# Patient Record
Sex: MALE | Race: WHITE | NOT HISPANIC OR LATINO | URBAN - METROPOLITAN AREA
[De-identification: names, ages, dates, MRNs, and addresses within clinical notes are randomized per-mention and may not be internally consistent; named-entity substitution may affect disease eponyms.]

---

## 2024-12-03 ENCOUNTER — OFFICE VISIT (OUTPATIENT)
Dept: URGENT CARE | Age: 24
End: 2024-12-03
Payer: MEDICARE

## 2024-12-03 VITALS
DIASTOLIC BLOOD PRESSURE: 85 MMHG | HEART RATE: 91 BPM | RESPIRATION RATE: 16 BRPM | OXYGEN SATURATION: 98 % | SYSTOLIC BLOOD PRESSURE: 125 MMHG

## 2024-12-03 DIAGNOSIS — M25.511 ACUTE PAIN OF RIGHT SHOULDER: ICD-10-CM

## 2024-12-03 DIAGNOSIS — M54.50 CHRONIC RIGHT-SIDED LOW BACK PAIN WITHOUT SCIATICA: Primary | ICD-10-CM

## 2024-12-03 DIAGNOSIS — K62.5 RECTAL BLEEDING: ICD-10-CM

## 2024-12-03 DIAGNOSIS — G89.29 CHRONIC RIGHT-SIDED LOW BACK PAIN WITHOUT SCIATICA: Primary | ICD-10-CM

## 2024-12-03 RX ORDER — METHOCARBAMOL 500 MG/1
500 TABLET, FILM COATED ORAL 4 TIMES DAILY
Qty: 30 TABLET | Refills: 0 | Status: SHIPPED | OUTPATIENT
Start: 2024-12-03 | End: 2024-12-13

## 2024-12-03 RX ORDER — ESCITALOPRAM OXALATE 10 MG/1
10 TABLET ORAL DAILY
COMMUNITY

## 2024-12-03 RX ORDER — LIDOCAINE 50 MG/G
1 PATCH TOPICAL DAILY
Qty: 10 PATCH | Refills: 0 | Status: SHIPPED | OUTPATIENT
Start: 2024-12-03

## 2024-12-03 RX ORDER — BUPROPION HYDROCHLORIDE 150 MG/1
TABLET, EXTENDED RELEASE ORAL 2 TIMES DAILY
COMMUNITY

## 2024-12-03 ASSESSMENT — ENCOUNTER SYMPTOMS
FEVER: 0
DIARRHEA: 0
COLOR CHANGE: 0
WOUND: 0
CONSTIPATION: 0
WEAKNESS: 0
BACK PAIN: 1
DIFFICULTY URINATING: 0

## 2024-12-04 NOTE — PROGRESS NOTES
"Subjective   Patient ID: Pillo Guevara is a 24 y.o. male. They present today with a chief complaint of Back Pain.    History of Present Illness  Patient is a 24 year old male presenting with multitude of symptoms. Reports back pain ongoing for years, has had intermittent pain especially over the last 2-3 years. 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 Pain is constant and wakes him up from sleep at night. Primarily located on right side of back and in shoulder. Worse with certain movements. Occasionally taking ibuprofen and using lidocaine patches that helps. Also waking up with intermittent tingling in hands and feet bilaterally at times that lasts for a few minutes.  No personal history of cancer or IV drug use, not immunosuppressed, no weakness or bowel or bladder incontinence but also reports rectal bleeding with bowel movements ongoing for \"awhile.\" No hematuria or urinary issues. Was doing PT previously for body aches that was helping but stopped due to insurance issues. Also reports he is taking estrogen that is not prescribed to him.       History provided by:  Patient   used: No        Past Medical History  Allergies as of 12/03/2024    (No Known Allergies)       (Not in a hospital admission)       No past medical history on file.    No past surgical history on file.         Review of Systems  Review of Systems   Constitutional:  Negative for fever.   Gastrointestinal:  Positive for blood in stool. Negative for constipation and diarrhea.   Genitourinary:  Negative for difficulty urinating.   Musculoskeletal:  Positive for back pain.   Skin:  Negative for color change, pallor, rash and wound.   Neurological:  Positive for numbness. Negative for weakness.                                  Objective    Vitals:    12/03/24 1907   BP: 125/85   Pulse: 91   Resp: 16   SpO2: 98%     No LMP for male patient.    Physical Exam  Constitutional:       General: He is not in " acute distress.     Appearance: Normal appearance. He is normal weight. He is not ill-appearing or toxic-appearing.   HENT:      Head: Normocephalic.   Eyes:      General: No scleral icterus.        Right eye: No discharge.         Left eye: No discharge.      Conjunctiva/sclera: Conjunctivae normal.   Neck:      Trachea: Phonation normal.   Pulmonary:      Effort: Pulmonary effort is normal. No respiratory distress.      Breath sounds: Normal breath sounds. No stridor. No wheezing.   Musculoskeletal:      Comments: Ambulatory with steady gait  Moving all extremities without difficulty   No reproducible tenderness of spine or paraspinal musculature    Neurological:      Mental Status: He is alert.      Gait: Gait normal.      Comments: 5/5 strength of upper and lower extremities    Psychiatric:         Mood and Affect: Mood normal.         Procedures    Point of Care Test & Imaging Results from this visit  No results found for this visit on 12/03/24.   No results found.    Diagnostic study results (if any) were reviewed by Kala Ortiz PA-C.    Assessment/Plan   Allergies, medications, history, and pertinent labs/EKGs/Imaging reviewed by Kala Ortiz PA-C.     Medical Decision Making  Patient presenting with multiple concerns including back pain.  No injury. No red flag symptoms.  He is he hemodynamically stable. No reproducible tenderness to spine or paraspinal musculature. No bony deformities. WOODS with 5/5 strength. Will treat symptomatically but recommended ER if worsening symptoms especially rectal bleeding as may need imaging. He will be referred to physical therapy per his request as this has helped in past as well as primary care to assist with managing some of these ongoing conditions. No injury, red flag symptoms, bony deformities, imaging not indicated at this time.     At time of discharge, patient was clinically well-appearing and appropriate for outpatient management. The  patient/parent/guardian was educated regarding diagnosis, supportive care, OTC and Rx medications. The patient/parent/guardian was given the opportunity to ask questions prior to discharge. They verbalized understanding of discussion of treatment plan, expected course of illness and/or injury, indications on when to return to , when to seek further evaluation in ED/call 911, and the need to follow up with PCP and/or specialist as referred. Patient/parent/guardian was provided with work/school documentation if requested. Patient stable upon discharge.     Orders and Diagnoses  Diagnoses and all orders for this visit:  Chronic right-sided low back pain without sciatica  -     lidocaine (Lidoderm) 5 % patch; Place 1 patch over 12 hours on the skin once daily. Remove & discard patch within 12 hours or as directed by MD.  -     methocarbamol (Robaxin) 500 mg tablet; Take 1 tablet (500 mg) by mouth 4 times a day for 10 days.  -     Referral to Physical Therapy; Future  Rectal bleeding  Acute pain of right shoulder  -     Referral to Physical Therapy; Future      Medical Admin Record      Patient disposition: Home    Electronically signed by Kala Ortiz PA-C  8:02 AM

## 2024-12-04 NOTE — PATIENT INSTRUCTIONS
Please establish care with a primary care doctor. If you have worsening symptoms including rectal bleeding, abdominal pain, numbness, bowel or bladder incontinence, go to the ER, you may need imaging or blood work.     Start using tylenol instead of ibuprofen as this can worsen bleeding.     Robaxin (the muscle relaxer) can make you drowsy. Do not drive or perform tasks that require a lot of concentration while taking.

## 2024-12-05 ASSESSMENT — ENCOUNTER SYMPTOMS
BLOOD IN STOOL: 1
NUMBNESS: 1

## 2025-02-28 ENCOUNTER — APPOINTMENT (OUTPATIENT)
Dept: PHYSICAL THERAPY | Facility: HOSPITAL | Age: 25
End: 2025-02-28
Payer: MEDICARE

## 2025-02-28 ENCOUNTER — EVALUATION (OUTPATIENT)
Dept: PHYSICAL THERAPY | Facility: HOSPITAL | Age: 25
End: 2025-02-28
Payer: MEDICARE

## 2025-02-28 DIAGNOSIS — G89.29 CHRONIC RIGHT-SIDED LOW BACK PAIN WITHOUT SCIATICA: ICD-10-CM

## 2025-02-28 DIAGNOSIS — M54.50 CHRONIC RIGHT-SIDED LOW BACK PAIN WITHOUT SCIATICA: ICD-10-CM

## 2025-02-28 DIAGNOSIS — M25.511 ACUTE PAIN OF RIGHT SHOULDER: ICD-10-CM

## 2025-02-28 DIAGNOSIS — M54.9 UPPER BACK PAIN ON RIGHT SIDE: Primary | ICD-10-CM

## 2025-02-28 PROCEDURE — 97162 PT EVAL MOD COMPLEX 30 MIN: CPT | Mod: GP | Performed by: PHYSICAL THERAPIST

## 2025-02-28 PROCEDURE — 97110 THERAPEUTIC EXERCISES: CPT | Mod: GP | Performed by: PHYSICAL THERAPIST

## 2025-02-28 PROCEDURE — 97535 SELF CARE MNGMENT TRAINING: CPT | Mod: GP | Performed by: PHYSICAL THERAPIST

## 2025-02-28 ASSESSMENT — ENCOUNTER SYMPTOMS
DEPRESSION: 0
OCCASIONAL FEELINGS OF UNSTEADINESS: 0
LOSS OF SENSATION IN FEET: 0

## 2025-02-28 NOTE — PROGRESS NOTES
Physical Therapy Evaluation and Treatment      Patient Name:Pillo Guevara   MRN:[id number]   Today's Date:2/28/2025   Referred by: Kala Ortiz   Time Calculation  Start Time: 0100  Stop Time: 0200  Time Calculation (min): 60 min    PT Evaluation Time Entry  PT Evaluation (Moderate) Time Entry: 35  PT Therapeutic Procedures Time Entry  Therapeutic Exercise Time Entry: 15  Self-Care/Home Mgmt Training: 10       Insurance  Visit number: 1   Approved number of visits: pending  Auth required: Yes  Authorization date range:   Onset Date: 12/3/2024    Payor: [employer] / Plan: [employer] / Product Type: *No Product type* /     Assessment:   23 yo patient presents with main c/o following:  R upper back pain, constant, lasting for a few months, with crunching sensation and noise with shoulder roll, displays rounded shoulder posture, slouched sitting, palpation with trigger point R levator, infraspinatus area, overall good strength but with pain, consistent with posture dysfunction, will engage in PT to work on posture stretching, manual trigger point release and work on scapular strengthening for sx management. Pt is educated in posture stretching, avoid continued habit of cracking joints for stretching, expectations reviewed, sitting posture reviewed, pt verbalized understanding.    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 Pt is in agreement of plan.       Rehab Potential: good  Clinical Presentation: Evolving with changing characteristics   Evaluation Complexity: moderate      Plan:   Visit number: 1   PT Plan: Skilled PT  PT Frequency: Other (Comment) (1-2/week)  Duration: 12  week  Onset Date: 12/03/24  Number of Treatments Authorized: caresource wagner needed  Rehab Potential: Good  Plan of Care Agreement: Patient    Planned interventions include:   R upper  back: posture stretching, STM with massage gun to R upper back, TENS or taping prn, scapular strengthening, can consider dry needling prn.   Lower back: Lower back with posture, body mechanics, core strengthening.  Modalities prn.   Frequency and duration: 1-2x/week 12 weeks   Plan of care was developed with input and agreement by the patient.     Precautions/Fall Risk:  Fall Risk: Low based on professional judgment  Pacemaker: no    Seizures: No    Post Op Movement/Restrictions: No:  Weight Bearing Status: As tolerated  Other Medical precautions:    Therapy Diagnosis:  Problem List Items Addressed This Visit             ICD-10-CM       Musculoskeletal and Injuries    Upper back pain on right side - Primary M54.9    Relevant Orders    Follow Up In Physical Therapy    Chronic right-sided low back pain without sciatica M54.50, G89.29    Relevant Orders    Follow Up In Physical Therapy     Other Visit Diagnoses         Codes    Acute pain of right shoulder     M25.511            Current Problem:   1. Upper back pain on right side  Follow Up In Physical Therapy      2. Chronic right-sided low back pain without sciatica  Referral to Physical Therapy    Follow Up In Physical Therapy      3. Acute pain of right shoulder  Referral to Physical Therapy                Subjective:  General:  General  Reason for Referral: M54.50,G89.29 (ICD-10-CM) - Chronic right-sided low back pain without sciatica  M25.511 (ICD-10-CM) - Acute pain of right shoulder  Referred By: Kala Ortiz  Past Medical History Relevant to Rehab: none significant  Preferred Learning Style: verbal, visual, written  Precautions:  Precautions  DANNY Fall Risk Score (The score of 4 or more indicates an increased risk of falling): 1    HPI: pt referred to PT from urgent care  with c/o R shoulder/upper back pain constant, mid to lower back pain, states LBP chronic but got worse over the last job working at bar with a lot of lifting, 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  Currently still has pain on lower back with lying down and prolonged standing with bending over positions.    R shoulder to upper back pain started a few months ago no DASH, notices increased pain with crunching noise with moving shoulder. Pain steady and affecting sleeping.    Pt is RHD.    Sleeping in regular bed.    Body weight 10-15# over the past year.      IMAGING: none.     PMH: none significant.      MEDICATIONS: ibuprofen prn for pain.  Was taking robaxin that was helpful.      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      BASELINE FUNCTION:  stretching  (seated HS stretching, butterfly stretching, trunk rotation/spinal twist), downward dog, shoulder rotation and cross shoulder stretch), using a backroller. Using theracane.     Pain:  Location of Pain: constant pain R shoulder and upper back, across lower back toward tailbone area.    Current Pain Level (0-10):  2/10  High Pain Level (0-10): 6   Low Pain Level (0-10): 2  Pain Exacerbating Factors: bending and lifting prolonged positioning, reaching with R arm, sleeping. Prolonged sitting, lying down.  Pain Relieving Factors: muscle relaxer.      Patient Awareness: Patient is aware of  his diagnosis and prognosis.       Objective   OBSERVATION: rounded shoulder and scapula protracted posture, slight loss of lumbar lordosis in sitting.    R scapular clock  with muscle crunching at L levator area.     SENSATION: WNL    PALPATION: significant TTP along R levator, rhomboids, infraspinatus area. OK with serratus ant.  OK with L spine paraspinals.      SHOULDER ROM: (R/L) *Pain WNL all directions discomfort with R upper back with overhead reaching.      SHOULDER STRENGTH:  (R/L) *Pain 4+/5 discomfort R upper back with MMT.    Flexion  Extension  Abduction  IR  ER      SPECIAL TEST:  Impingement test:  Painful arc   neg        Scapulohumeral rhythm WNL      FUNCTIONAL OUTCOME MEASURE:--Quick Dash 20%    L spine evaluation:  L spine ROM WNL, pinching sensation with end range ext, R SB and L rot at lower L spine and L.S junction.    Strength WNL B LE  LE bicycle with decreased core control with discomfort on lower back  Upper abdominal able to complete sit up x3    Special test:   Neg SLR  Prone hip ext with pain at L.S junction  L spine joint play decreased with no increased pain  PIST +    Functional strength   SLS WNL  Double leg squat with  no increased pain  Supine bridging single leg with no increased pain  Oswestry 18%    Outcome Measures:  Other Measures  Disability of Arm Shoulder Hand (DASH): 20  Oswestry Disablity Index (DORON): 18     Treatments:     Treatment Performed Today: Treatment provided today:   Educated patient on evaluation findings and POC, expectations and course of PT, questions addressed.     Educated to avoid continued overstretching, educated on proper posture and implication with driving,  HEP instructed    Response to Treatment: improved knowledge and understanding of condition    Education/Resources provided today: Home Program   [employer]:  Access Code: [email or web address]  URL: [email or web address]  Date: 02/28/2025  Prepared by: Sophie Snow    Exercises  - chest stretch - Arms Behind Back  - 3 x daily - 5 x weekly - 1 sets - 3 reps - 5sec hold  - Seated Scapular Retraction  - 3 x daily - 5 x weekly - 1 sets - 3 reps - 5sec hold  - Seated Single Arm Shoulder Row with Anchored Resistance  - 1 x daily - 3 x weekly - 2 sets - 15 reps  - Seated High Shoulder Row with Anchored Resistance  - 1 x daily - 3 x weekly - 2 sets - 15 reps  - Supine Bridge  - 1 x daily - 3 x weekly - 3 sets - 10 reps    OP EDUCATION:  Outpatient Education  Individual(s)  Educated: Patient  Education Provided: Home Exercise Program, POC  Patient/Caregiver Demonstrated Understanding: yes  Plan of Care Discussed and Agreed Upon: yes  Patient Response to Education: Patient/Caregiver Verbalized Understanding of Information    Goals:  Patient's Goal for Treatment: Relieve pain, Reduce symptoms, and Return to prior level of function    To be met at time of discharge:  -- Decrease pain to _<1/10__.  -- Independent with HEP.  -- ROM: no pain with shoulder and L spine ROM with testing.   -- Strength: no pain with MMT B shoulder.   -- Functional outcome: demonstrate awareness of proper sitting posture in clinic.  Able to tolerate scapular strengthening and RTC strengthening with no increased pain x25min in clinic.  Able to tolerate prone hip ext, PIST test with no increased pain or pinching sensation along L spine.  Improve quick dash to <10%, DORON to <8%.   Demonstrate awareness of proper body mechanics and tolerating core strengthening with no increased pain after.  Able to resume a cardio program (rowing at his preference) for fitness program consistently.

## 2025-03-13 ENCOUNTER — APPOINTMENT (OUTPATIENT)
Dept: PHYSICAL THERAPY | Facility: HOSPITAL | Age: 25
End: 2025-03-13
Payer: MEDICARE

## 2025-04-23 ENCOUNTER — TREATMENT (OUTPATIENT)
Dept: PHYSICAL THERAPY | Facility: HOSPITAL | Age: 25
End: 2025-04-23
Payer: MEDICARE

## 2025-04-23 DIAGNOSIS — M54.50 CHRONIC RIGHT-SIDED LOW BACK PAIN WITHOUT SCIATICA: ICD-10-CM

## 2025-04-23 DIAGNOSIS — M54.9 UPPER BACK PAIN ON RIGHT SIDE: ICD-10-CM

## 2025-04-23 DIAGNOSIS — G89.29 CHRONIC RIGHT-SIDED LOW BACK PAIN WITHOUT SCIATICA: ICD-10-CM

## 2025-04-23 PROCEDURE — 97140 MANUAL THERAPY 1/> REGIONS: CPT | Mod: GP,CQ

## 2025-04-23 PROCEDURE — 97110 THERAPEUTIC EXERCISES: CPT | Mod: GP,CQ

## 2025-04-23 NOTE — PROGRESS NOTES
Physical Therapy Treatment    Patient Name:Pillo Guevara  MRN:[id number]  Today's Date:4/23/2025  Referred by: Kala Ortiz  Time Calculation  Start Time: 1520  Stop Time: 1616  Time Calculation (min): 56 min    Therapy Diagnosis  Problem List Items Addressed This Visit           ICD-10-CM    Upper back pain on right side M54.9    Chronic right-sided low back pain without sciatica M54.50, G89.29       Assessment:   Pt tolerated treatment session well this date. Pt with slight crepitus at times intermittently in R shoulder/scapular region during serratus punches and shoulder ROM/weight bearing activities and exercises. Pt with improved ROM in BLE following manual stretching this date, pt also achieving improved ROM as reps/duration elapsed during stretches and exercises. Pt with slight discomfort/soreness in R>L hip region during swiss ball exercises this date.       Plan:  1-2x/week 12 weeks    R upper  back: posture stretching, STM with massage gun to R upper back, TENS or taping prn, scapular strengthening, can consider dry needling prn.   Lower back: Lower back with posture, body mechanics, core strengthening.  Modalities prn.     Insurance  Visit number: 2  Approved number of visits: pending  Auth required: Yes  Authorization date range:   Onset Date: 12/3/2024     Payor: [employer] / Plan: [employer] / Product Type: *No Product type* /     Precautions/Fall Risk:Fall Risk: Low based on professional judgment  Pacemaker: no    Seizures: No    Post Op Movement/Restrictions: No:  Weight Bearing Status: As tolerated  Other Medical precautions:    Subjective/Pain: Pt reports at work as a , or after/during prolonged sitting or laying supine causes pain in mid back, low back and R shoulder region. Pt states it has been difficult to get to sleep and sleep/schedule has been tough. Pt reports various previous issues/injuries related to low back as well.   Current Pain Level (0-10):  2    HEP Compliance: Good    Objective/Outcome Measures:  Fwd head round shoulder posture    Slight dec lumbar lordosis in seated and standing    R scapular crepitus with ROM exercises.    Treatment  Therapeutic Procedure PT Therapeutic Procedures Time Entry  Manual Therapy Time Entry: 12  Therapeutic Exercise Time Entry: 44 minutes      Therapeutic Exercise 44 minutes    6 mins upright bicycle lvl 3  Supine SPC AAROM; B Sh flex and B Serratus Punch x 30 ea  2 mins open/close books on swiss ball   3 mins sciatic glides on swiss ball  2 mins LTR on swiss ball  Quadruped serratus punches x 2 mins  Cipriano pose<>prone on elbows 2 x 30s holds  Wall crawl x 30s  Door way stretch x 30s  Cervical ext self mobe x 30s  Seated retro shoulder rolls 3 x 30  -hep updated and provided-    Manual Therapy 12 minutes    Manual BLE stretches; Hamstring, Piriformis, SKTC 2 x 30s       Neuromuscular Re-ed   minutes      Gait Training   minutes    Modalities   Vasopneumatic Device       minutes  Electrical Stimulation            minutes  Ultrasound                      minutes  Iontophoresis                     minutes  Cold Pack                        minutes  Mechanical Traction           minutes    OP EDUCATION:   Access Code: [email or web address]  URL: [email or web address]  Date: 04/23/2025  Prepared by: Zeeshan Guillen    Exercises  - Seated Levator Scapulae Stretch  - 1 x daily - 7 x weekly - 1 sets - 2 reps - 30s hold  - Doorway Pec Stretch at 90 Degrees Abduction  - 1 x daily - 7 x weekly - 1 sets - 3 reps - 30s hold  - Serratus Activation at Wall  - 1 x daily - 7 x weekly - 1 sets - 10 reps - 10s hold  - Kneeling Plank with Scapular Protraction Retraction AROM  - 1 x daily - 7 x weekly - 3 sets - 10 reps  - Serratus Forearm Push Up Plus at Wall with Elbows  - 1 x daily - 7 x weekly - 3 sets - 10 reps  - Sidelying Open Book Thoracic Lumbar Rotation and Extension  - 1 x daily - 7 x weekly - 2 sets - 10 reps - 2-4s  hold  - Seated Shoulder Rolls  - 1 x daily - 7 x weekly - 3 sets - 10 reps  - Seated Scapular Retraction  - 1 x daily - 7 x weekly - 1 sets - 10 reps - 10s hold  - Child's Pose Stretch  - 1 x daily - 7 x weekly - 1 sets - 2 reps - 30s hold  - Static Prone on Elbows  - 1 x daily - 7 x weekly - 1 sets - 2 reps - 30s hold    Goals:  Patient's Goal for Treatment: Relieve pain, Reduce symptoms, and Return to prior level of function     To be met at time of discharge:  -- Decrease pain to _<1/10__.  -- Independent with HEP.  -- ROM: no pain with shoulder and L spine ROM with testing.   -- Strength: no pain with MMT B shoulder.   -- Functional outcome: demonstrate awareness of proper sitting posture in clinic.  --Able to tolerate scapular strengthening and RTC strengthening with no increased pain x25min in clinic.  --Able to tolerate prone hip ext, PIST test with no increased pain or pinching sensation along L spine.  --Improve quick dash to <10%, DORON to <8%.   --Demonstrate awareness of proper body mechanics and tolerating core strengthening with no increased pain after.  --Able to resume a cardio program (rowing at their preference) for fitness program consistently.

## 2025-05-01 ENCOUNTER — TREATMENT (OUTPATIENT)
Dept: PHYSICAL THERAPY | Facility: HOSPITAL | Age: 25
End: 2025-05-01
Payer: MEDICARE

## 2025-05-01 DIAGNOSIS — M54.9 UPPER BACK PAIN ON RIGHT SIDE: ICD-10-CM

## 2025-05-01 DIAGNOSIS — G89.29 CHRONIC RIGHT-SIDED LOW BACK PAIN WITHOUT SCIATICA: ICD-10-CM

## 2025-05-01 DIAGNOSIS — M54.50 CHRONIC RIGHT-SIDED LOW BACK PAIN WITHOUT SCIATICA: ICD-10-CM

## 2025-05-01 PROCEDURE — 97110 THERAPEUTIC EXERCISES: CPT | Mod: GP,CQ

## 2025-05-01 PROCEDURE — 97140 MANUAL THERAPY 1/> REGIONS: CPT | Mod: GP,CQ

## 2025-05-01 NOTE — PROGRESS NOTES
Physical Therapy Treatment    Patient Name:Pillo Guevara  MRN:[id number]  Today's Date:5/1/2025  Referred by: Kala Ortiz  Time Calculation  Start Time: 1508  Stop Time: 1548  Time Calculation (min): 40 min    Therapy Diagnosis  Problem List Items Addressed This Visit           ICD-10-CM    Upper back pain on right side M54.9    Chronic right-sided low back pain without sciatica M54.50, G89.29       Assessment:   Pt with good overall tolerance with today's treatment. Pt progressing to focusing on improving scap retraction, thoracic extension and back strengthening. Pt receiving manual stretch techniques to improve BLE ROM while also focusing on improve B Shoulder ROM and strength with SPC therex. Pt overall with improved ROM noted this date. Pt then challenged with inc resistance exercises and more difficulty scap stabilization therex. Pt provided with BUE strengthening HEP with resistance bands this date and educated/reviewed      Plan:   1-2x/week 12 weeks    R upper  back: posture stretching, strengthening, TENS or taping prn, scapular strengthening, can consider dry needling prn.   Lower back: Lower back with posture, body mechanics, core strengthening.  Modalities prn.        Insurance  Visit number: 3  Approved number of visits: 20  Auth required: Yes  Authorization date range: 20 VISITS, 2/28/25-5/30/25  Onset Date: 12/3/2024     Payor: [employer] / Plan: [employer] / Product Type: *No Product type* /      Precautions/Fall Risk:Fall Risk: Low based on professional judgment  Pacemaker: no    Seizures: No    Post Op Movement/Restrictions: No:  Weight Bearing Status: As tolerated  Other Medical precautions:    Subjective/Pain: Pt reports pain has diminished since last session. Pt reports feeling a good workout soreness following previous treatment. Pt notes that they went bike riding with a friend and felt pretty good. Pt able to perform all stretches without issues at home. Pt noting  has resistance band and was performing strengthening as well.   Current Pain Level (0-10): 2    HEP Compliance: Good    Objective/Outcome Measures:    Fwd head round shoulder posture     Slight dec lumbar lordosis in seated and standing     R scapular crepitus with ROM exercises.    Treatment  Therapeutic Procedure PT Therapeutic Procedures Time Entry  Manual Therapy Time Entry: 12  Therapeutic Exercise Time Entry: 28 minutes      Therapeutic Exercise 28 minutes  8 mins UBE+BLE with 4/4 lvl 4  Supine 3# SPC; B Sh flex, B Serratus Punch and B Sh ER x 30 ea  3 mins sciatic glides on swiss ball  2 mins LTR on swiss ball  Bridges on swiss ball 2 x 10  Quadruped serratus punches x 2 mins  Cipriano pose<>prone on elbows 2 x 30s holds  Cobra 2 x 30s holds  Cybex rows 35# 3 x 10  Foam roller Serratus activation/thoracic ext at wall x 30  -HEP updated with resistance band strengthening-    Not today  2 mins open/close books on swiss ball   Wall crawl x 30s  Door way stretch x 30s  Cervical ext self mobe x 30s  Seated retro shoulder rolls 3 x 30  -hep updated and provided-      Manual Therapy 12 minutes  Manual BLE stretches; Hamstring, Piriformis, SKTC 2 x 30s    Manual scap retract assessment throughout    Neuromuscular Re-ed   minutes      Gait Training   minutes    Modalities   Vasopneumatic Device       minutes  Electrical Stimulation            minutes  Ultrasound                      minutes  Iontophoresis                     minutes  Cold Pack                        minutes  Mechanical Traction           minutes    OP EDUCATION:   Access Code: [email or web address]  URL: [email or web address]  Date: 05/01/2025  Prepared by: Zeeshan Guillen    Exercises  - Standing Shoulder Horizontal Abduction with Resistance  - 1 x daily - 3-4 x weekly - 3 sets - 10 reps  - Shoulder External Rotation and Scapular Retraction with Resistance  - 1 x daily - 3-4 x weekly - 3 sets - 10 reps  - Shoulder extension with resistance -  Neutral  - 1 x daily - 3-4 x weekly - 3 sets - 10 reps  - Standing Lat Pull Down with Resistance - Elbows Bent  - 1 x daily - 3-4 x weekly - 3 sets - 10 reps  - Standing Tricep Extensions with Resistance  - 1 x daily - 3-4 x weekly - 3 sets - 10 reps  - Standing Shoulder Row with Anchored Resistance  - 1 x daily - 3-4 x weekly - 3 sets - 10 reps  - Standing Bicep Curls with Resistance Band with PLB  - 1 x daily - 3-4 x weekly - 3 sets - 10 reps  - Standing Shoulder Flexion with Resistance  - 1 x daily - 3-4 x weekly - 3 sets - 10 reps  - Shoulder Flexion Serratus Activation with Resistance  - 1 x daily - 3-4 x weekly - 3 sets - 10 reps  - Standing Serratus Punch with Resistance  - 1 x daily - 3-4 x weekly - 3 sets - 10 reps  - Standing Low Trap Setting with Resistance at Wall  - 1 x daily - 3-4 x weekly - 3 sets - 10 reps  - Standing Plank on Wall with Reaches and Resistance  - 1 x daily - 3-4 x weekly - 3 sets - 10 reps    Goals:    Patient's Goal for Treatment: Relieve pain, Reduce symptoms, and Return to prior level of function     To be met at time of discharge:  -- Decrease pain to _<1/10__.  -- Independent with HEP.  -- ROM: no pain with shoulder and L spine ROM with testing.   -- Strength: no pain with MMT B shoulder.   -- Functional outcome: demonstrate awareness of proper sitting posture in clinic.  --Able to tolerate scapular strengthening and RTC strengthening with no increased pain x25min in clinic.  --Able to tolerate prone hip ext, PIST test with no increased pain or pinching sensation along L spine.  --Improve quick dash to <10%, DORON to <8%.   --Demonstrate awareness of proper body mechanics and tolerating core strengthening with no increased pain after.  --Able to resume a cardio program (rowing at their preference) for fitness program consistently.

## 2025-05-06 ENCOUNTER — TREATMENT (OUTPATIENT)
Dept: PHYSICAL THERAPY | Facility: HOSPITAL | Age: 25
End: 2025-05-06
Payer: MEDICARE

## 2025-05-06 DIAGNOSIS — M54.50 CHRONIC RIGHT-SIDED LOW BACK PAIN WITHOUT SCIATICA: ICD-10-CM

## 2025-05-06 DIAGNOSIS — G89.29 CHRONIC RIGHT-SIDED LOW BACK PAIN WITHOUT SCIATICA: ICD-10-CM

## 2025-05-06 DIAGNOSIS — M54.9 UPPER BACK PAIN ON RIGHT SIDE: ICD-10-CM

## 2025-05-06 PROCEDURE — 97110 THERAPEUTIC EXERCISES: CPT | Mod: GP,CQ

## 2025-05-06 PROCEDURE — 97140 MANUAL THERAPY 1/> REGIONS: CPT | Mod: GP,CQ

## 2025-05-06 NOTE — PROGRESS NOTES
Physical Therapy Treatment    Patient Name:Pillo Guevara  MRN:[id number]  Today's Date:5/6/2025  Referred by: Kala Ortiz  Time Calculation  Start Time: 1635  Stop Time: 1723  Time Calculation (min): 48 min    Therapy Diagnosis  Problem List Items Addressed This Visit           ICD-10-CM    Upper back pain on right side M54.9    Chronic right-sided low back pain without sciatica M54.50, G89.29       Assessment:   Pt tolerated treatment session well this date. Pt absent of c/o R scapular shoulder pain, discomfort or clicking/popping this date. Pt challenged with weight bearing/shifting with core/abdominal therex this date along with continued resistance scapular mobility and glenohumeral joint therex in tandem with BLE stretches and manual techniques. Pt continues to perform HEP when pt can. Pt progressing well thus far.       Plan:    1-2x/week 12 weeks    R upper  back: posture stretching, strengthening, TENS or taping prn, scapular strengthening, can consider dry needling prn.   Lower back: Lower back with posture, body mechanics, core strengthening.  Modalities prn.     Insurance  Visit number: 4  Approved number of visits: 20  Auth required: Yes  Authorization date range: 20 VISITS, 2/28/25-5/30/25  Onset Date: 12/3/2024     Payor: [employer] / Plan: [employer] / Product Type: *No Product type* /      Precautions/Fall Risk:Fall Risk: Low based on professional judgment  Pacemaker: no    Seizures: No    Post Op Movement/Restrictions: No:  Weight Bearing Status: As tolerated  Other Medical precautions:      Subjective/Pain: Pt reports slight pain/exacerbation in R sided lower lumbar region. Pt states they were able to stretch this AM following waking up with pain/stiffness and reducing the pain experienced.   Current Pain Level (0-10): 2    HEP Compliance: Good    Objective/Outcome Measures:     Fwd head round shoulder posture     Slight dec lumbar lordosis in seated and standing     R  scapular crepitus with ROM exercises.    Treatment  Therapeutic Procedure PT Therapeutic Procedures Time Entry  Manual Therapy Time Entry: 12  Therapeutic Exercise Time Entry: 36 minutes      Therapeutic Exercise 36 minutes  8 mins upright bicycle lvl 2  Supine 4# SPC; B Sh flex, B Serratus Punch, kayak rows, and B Sh ER x 30 ea  Cipriano pose<>prone on elbows 2 x 30s holds  Quadruped alt LE kicks x 20 ea LE  Quadruped alt UE reach x 20 ea UE  Bird Dogs x 20 ea  3 mins sciatic glides on swiss ball  2 mins LTR on swiss ball  Bridges on swiss ball 2 x 10      Not today  Quadruped serratus punches x 2 mins  Cobra 2 x 30s holds  Cybex rows 35# 3 x 10  Foam roller Serratus activation/thoracic ext at wall x 30  -HEP updated with resistance band strengthening-  2 mins open/close books on swiss ball   Wall crawl x 30s  Door way stretch x 30s  Cervical ext self mobe x 30s  Seated retro shoulder rolls 3 x 30  -hep updated and provided-      Manual Therapy 12 minutes    Manual BLE stretches; Hamstring, Piriformis, SKTC 2 x 30s    Manual scap retract assessment throughout       Neuromuscular Re-ed   minutes      Gait Training   minutes    Modalities   Vasopneumatic Device       minutes  Electrical Stimulation            minutes  Ultrasound                      minutes  Iontophoresis                     minutes  Cold Pack                        minutes  Mechanical Traction           minutes    OP EDUCATION:       Goals:     Patient's Goal for Treatment: Relieve pain, Reduce symptoms, and Return to prior level of function     To be met at time of discharge:  -- Decrease pain to _<1/10__.  -- Independent with HEP.  -- ROM: no pain with shoulder and L spine ROM with testing.   -- Strength: no pain with MMT B shoulder.   -- Functional outcome: demonstrate awareness of proper sitting posture in clinic.  --Able to tolerate scapular strengthening and RTC strengthening with no increased pain x25min in clinic.  --Able to tolerate prone hip  ext, PIST test with no increased pain or pinching sensation along L spine.  --Improve quick dash to <10%, DORON to <8%.   --Demonstrate awareness of proper body mechanics and tolerating core strengthening with no increased pain after.  --Able to resume a cardio program (rowing at their preference) for fitness program consistently.

## 2025-05-12 ENCOUNTER — APPOINTMENT (OUTPATIENT)
Dept: PRIMARY CARE | Facility: CLINIC | Age: 25
End: 2025-05-12
Payer: MEDICARE

## 2025-05-14 ENCOUNTER — OFFICE VISIT (OUTPATIENT)
Dept: PRIMARY CARE | Facility: CLINIC | Age: 25
End: 2025-05-14
Payer: MEDICARE

## 2025-05-14 VITALS
DIASTOLIC BLOOD PRESSURE: 68 MMHG | BODY MASS INDEX: 34.95 KG/M2 | SYSTOLIC BLOOD PRESSURE: 120 MMHG | HEART RATE: 94 BPM | HEIGHT: 72 IN | OXYGEN SATURATION: 97 % | WEIGHT: 258 LBS

## 2025-05-14 DIAGNOSIS — F33.2 SEVERE EPISODE OF RECURRENT MAJOR DEPRESSIVE DISORDER, WITHOUT PSYCHOTIC FEATURES (MULTI): ICD-10-CM

## 2025-05-14 DIAGNOSIS — F17.200 NICOTINE USE DISORDER: ICD-10-CM

## 2025-05-14 DIAGNOSIS — M54.9 UPPER BACK PAIN ON RIGHT SIDE: ICD-10-CM

## 2025-05-14 DIAGNOSIS — M54.50 CHRONIC RIGHT-SIDED LOW BACK PAIN WITHOUT SCIATICA: ICD-10-CM

## 2025-05-14 DIAGNOSIS — Z78.9: ICD-10-CM

## 2025-05-14 DIAGNOSIS — G89.29 CHRONIC RIGHT-SIDED LOW BACK PAIN WITHOUT SCIATICA: ICD-10-CM

## 2025-05-14 DIAGNOSIS — F41.1 GAD (GENERALIZED ANXIETY DISORDER): ICD-10-CM

## 2025-05-14 DIAGNOSIS — F33.41 MAJOR DEPRESSIVE DISORDER, RECURRENT EPISODE, IN PARTIAL REMISSION: ICD-10-CM

## 2025-05-14 DIAGNOSIS — F12.20 CANNABIS DEPENDENCE: ICD-10-CM

## 2025-05-14 DIAGNOSIS — Z00.00 WELL ADULT EXAM: Primary | ICD-10-CM

## 2025-05-14 PROCEDURE — 99385 PREV VISIT NEW AGE 18-39: CPT | Performed by: STUDENT IN AN ORGANIZED HEALTH CARE EDUCATION/TRAINING PROGRAM

## 2025-05-14 PROCEDURE — 3008F BODY MASS INDEX DOCD: CPT | Performed by: STUDENT IN AN ORGANIZED HEALTH CARE EDUCATION/TRAINING PROGRAM

## 2025-05-14 PROCEDURE — 99214 OFFICE O/P EST MOD 30 MIN: CPT | Performed by: STUDENT IN AN ORGANIZED HEALTH CARE EDUCATION/TRAINING PROGRAM

## 2025-05-14 RX ORDER — ESCITALOPRAM OXALATE 10 MG/1
10 TABLET ORAL DAILY
Qty: 90 TABLET | Refills: 0 | Status: SHIPPED | OUTPATIENT
Start: 2025-05-14

## 2025-05-14 RX ORDER — CYCLOBENZAPRINE HCL 5 MG
5 TABLET ORAL NIGHTLY PRN
Qty: 21 TABLET | Refills: 0 | Status: SHIPPED | OUTPATIENT
Start: 2025-05-14 | End: 2025-06-04

## 2025-05-14 RX ORDER — BUPROPION HYDROCHLORIDE 150 MG/1
150 TABLET, EXTENDED RELEASE ORAL 2 TIMES DAILY
Qty: 180 TABLET | Refills: 0 | Status: SHIPPED | OUTPATIENT
Start: 2025-05-14

## 2025-05-14 RX ORDER — NAPROXEN 500 MG/1
500 TABLET ORAL 2 TIMES DAILY PRN
Qty: 28 TABLET | Refills: 0 | Status: SHIPPED | OUTPATIENT
Start: 2025-05-14 | End: 2025-05-28

## 2025-05-14 ASSESSMENT — COLUMBIA-SUICIDE SEVERITY RATING SCALE - C-SSRS
1. IN THE PAST MONTH, HAVE YOU WISHED YOU WERE DEAD OR WISHED YOU COULD GO TO SLEEP AND NOT WAKE UP?: NO
6. HAVE YOU EVER DONE ANYTHING, STARTED TO DO ANYTHING, OR PREPARED TO DO ANYTHING TO END YOUR LIFE?: NO
2. HAVE YOU ACTUALLY HAD ANY THOUGHTS OF KILLING YOURSELF?: NO

## 2025-05-14 ASSESSMENT — PATIENT HEALTH QUESTIONNAIRE - PHQ9
2. FEELING DOWN, DEPRESSED OR HOPELESS: SEVERAL DAYS
1. LITTLE INTEREST OR PLEASURE IN DOING THINGS: SEVERAL DAYS
SUM OF ALL RESPONSES TO PHQ9 QUESTIONS 1 AND 2: 2
2. FEELING DOWN, DEPRESSED OR HOPELESS: SEVERAL DAYS
10. IF YOU CHECKED OFF ANY PROBLEMS, HOW DIFFICULT HAVE THESE PROBLEMS MADE IT FOR YOU TO DO YOUR WORK, TAKE CARE OF THINGS AT HOME, OR GET ALONG WITH OTHER PEOPLE: SOMEWHAT DIFFICULT
1. LITTLE INTEREST OR PLEASURE IN DOING THINGS: SEVERAL DAYS
SUM OF ALL RESPONSES TO PHQ9 QUESTIONS 1 AND 2: 2

## 2025-05-14 ASSESSMENT — ENCOUNTER SYMPTOMS
EYE REDNESS: 0
ARTHRALGIAS: 1
SINUS PRESSURE: 0
ABDOMINAL PAIN: 0
SHORTNESS OF BREATH: 0
CHEST TIGHTNESS: 0
MYALGIAS: 1
AGITATION: 0
FEVER: 0
SORE THROAT: 0
BACK PAIN: 1
WHEEZING: 0
DIZZINESS: 0
PHOTOPHOBIA: 0
COUGH: 0
CHILLS: 0
SINUS PAIN: 0

## 2025-05-14 ASSESSMENT — ANXIETY QUESTIONNAIRES
7. FEELING AFRAID AS IF SOMETHING AWFUL MIGHT HAPPEN: SEVERAL DAYS
4. TROUBLE RELAXING: NOT AT ALL
IF YOU CHECKED OFF ANY PROBLEMS ON THIS QUESTIONNAIRE, HOW DIFFICULT HAVE THESE PROBLEMS MADE IT FOR YOU TO DO YOUR WORK, TAKE CARE OF THINGS AT HOME, OR GET ALONG WITH OTHER PEOPLE: SOMEWHAT DIFFICULT
3. WORRYING TOO MUCH ABOUT DIFFERENT THINGS: SEVERAL DAYS
6. BECOMING EASILY ANNOYED OR IRRITABLE: SEVERAL DAYS
1. FEELING NERVOUS, ANXIOUS, OR ON EDGE: SEVERAL DAYS
2. NOT BEING ABLE TO STOP OR CONTROL WORRYING: NOT AT ALL
GAD7 TOTAL SCORE: 5
5. BEING SO RESTLESS THAT IT IS HARD TO SIT STILL: SEVERAL DAYS

## 2025-05-14 NOTE — ASSESSMENT & PLAN NOTE
DIY Estrogen  Estradiol enanthate  20mg/ml solution    0.3 ml/weekly  Getting from a friend who get the medication online

## 2025-05-14 NOTE — PROGRESS NOTES
Subjective   Patient ID: Margaret Guevara is a 24 y.o. adult who presents for New Patient Visit (Establish care, back pain that is chronic, No specific injury that they can recall. ).    SUBJECTIVE:   Margaret Guevara is a 24 y.o. male presenting for his annual checkup/establish care. Hx of MDD/Anxiety, acute on chronic upper and lower back pain, non radiating, no numbness, tingling, paresthesia, no saddle anesthesia, no urinary or bowel changes, no trauma. Aggravated from strenuous activity, gym, rowing crew, improves and alleviates with rest. Currently unemployed has potential employment doing interviews, lives alone, sexually active with both female and male, hx of hemorrhoids no brbpr/melena. Denies SI/HI/hallucinations. Vapes and marijuana user cutting back on his own, social drinker. Denies fever, chills, sob,  Current Outpatient Medications:  buPROPion SR (Wellbutrin SR) 150 mg 12 hr tablet, Take by mouth 2 times a day. Do not crush, chew, or split., Disp: , Rfl:   escitalopram (Lexapro) 10 mg tablet, Take 1 tablet (10 mg) by mouth once daily., Disp: , Rfl:   lidocaine (Lidoderm) 5 % patch, Place 1 patch over 12 hours on the skin once daily. Remove & discard patch within 12 hours or as directed by MD., Disp: 10 patch, Rfl: 0  methocarbamol (Robaxin) 500 mg tablet, Take 1 tablet (500 mg) by mouth 4 times a day for 10 days., Disp: 30 tablet, Rfl: 0    No current facility-administered medications for this visit.    Allergies: Patient has no known allergies.     ROS:  Feeling well. No dyspnea or chest pain on exertion. No abdominal pain, change in bowel habits, black or bloody stools. No urinary tract symptoms. No neurological complaints.    {lifestyle advice:174414}           Review of Systems   Constitutional:  Negative for chills and fever.   HENT:  Negative for congestion, postnasal drip, sinus pressure, sinus pain, sore throat and tinnitus.    Eyes:  Negative for photophobia and redness.   Respiratory:   Negative for cough, chest tightness, shortness of breath and wheezing.    Cardiovascular:  Negative for chest pain.   Gastrointestinal:  Negative for abdominal pain.   Musculoskeletal:  Positive for arthralgias, back pain and myalgias.   Skin:  Negative for rash.   Neurological:  Negative for dizziness.   Psychiatric/Behavioral:  Negative for agitation.    All other systems reviewed and are negative.      Objective   /68   Pulse 94   Wt 117 kg (258 lb)   SpO2 97%     Physical Exam  Vitals and nursing note reviewed.   Constitutional:       Appearance: Normal appearance.   HENT:      Head: Normocephalic and atraumatic.      Nose: Nose normal.   Eyes:      Extraocular Movements: Extraocular movements intact.      Conjunctiva/sclera: Conjunctivae normal.      Pupils: Pupils are equal, round, and reactive to light.      Comments: Conjunctiva normal   No discharge, drainage  No chemosis  No periorbital or orbital cellulitis    Cardiovascular:      Rate and Rhythm: Normal rate and regular rhythm.   Pulmonary:      Effort: Pulmonary effort is normal.      Breath sounds: Normal breath sounds.      Comments: Speaking in full sentences  No labored breathing  No use of accessory muscles  LUNGS CTAB   Abdominal:      Palpations: Abdomen is soft.   Musculoskeletal:         General: Tenderness present. Normal range of motion.      Cervical back: Normal range of motion.      Comments: Spine midline  No bony ttp  + paraspinal lumbar and thoracic   +hypertonic ttp /spasm  Gait stable   Skin:     General: Skin is warm and dry.      Capillary Refill: Capillary refill takes less than 2 seconds.   Neurological:      General: No focal deficit present.      Mental Status: She is alert and oriented to person, place, and time.   Psychiatric:         Mood and Affect: Mood normal.         Behavior: Behavior normal.         Thought Content: Thought content normal.         Judgment: Judgment normal.         Assessment/Plan    {Assess/[facility]:25368}        exercise  Alternate between cool and warm compresses  Flexeril precaution discussed  Orders:    cyclobenzaprine (Flexeril) 5 mg tablet; Take 1 tablet (5 mg) by mouth as needed at bedtime for muscle spasms for up to 21 days.    naproxen (Naprosyn) 500 mg tablet; Take 1 tablet (500 mg) by mouth 2 times a day as needed for mild pain (1 - 3) (pain) for up to 14 days.    Chronic right-sided low back pain without sciatica  Stable  No red flags or alarming signs  No focal neurological deficit on exam  Rx naproxen, flexeril  As above  Orders:    cyclobenzaprine (Flexeril) 5 mg tablet; Take 1 tablet (5 mg) by mouth as needed at bedtime for muscle spasms for up to 21 days.    naproxen (Naprosyn) 500 mg tablet; Take 1 tablet (500 mg) by mouth 2 times a day as needed for mild pain (1 - 3) (pain) for up to 14 days.    Severe episode of recurrent major depressive disorder, without psychotic features (Multi)  Stable  Denies SI/HI/Hallucinations  Continue wellbutrin, lexapro  Encouraged compliancy  Referral to Behavior Health for CBT         Cannabis dependence  Counseled on health risk with cannabis use       Nicotine use disorder  Counseled on smoking cessation  Declined nicotine replacement at this time       BMI 34.0-34.9,adult  Lifestyle changes with heart healthy diet, exercise, and weight loss

## 2025-05-15 ENCOUNTER — TREATMENT (OUTPATIENT)
Dept: PHYSICAL THERAPY | Facility: HOSPITAL | Age: 25
End: 2025-05-15
Payer: MEDICARE

## 2025-05-15 DIAGNOSIS — M54.9 UPPER BACK PAIN ON RIGHT SIDE: ICD-10-CM

## 2025-05-15 DIAGNOSIS — M54.50 CHRONIC RIGHT-SIDED LOW BACK PAIN WITHOUT SCIATICA: ICD-10-CM

## 2025-05-15 DIAGNOSIS — G89.29 CHRONIC RIGHT-SIDED LOW BACK PAIN WITHOUT SCIATICA: ICD-10-CM

## 2025-05-15 PROCEDURE — 97110 THERAPEUTIC EXERCISES: CPT | Mod: GP,CQ

## 2025-05-15 PROCEDURE — 97140 MANUAL THERAPY 1/> REGIONS: CPT | Mod: GP,CQ

## 2025-05-15 NOTE — PROGRESS NOTES
Physical Therapy Treatment    Patient Name:Pillo Guevara  MRN:[id number]  Today's Date:5/15/2025  Referred by: Kala Ortiz  Time Calculation  Start Time: 1638  Stop Time: 1734  Time Calculation (min): 56 min    Therapy Diagnosis  Problem List Items Addressed This Visit           ICD-10-CM    Upper back pain on right side M54.9    Chronic right-sided low back pain without sciatica M54.50, G89.29       Assessment:   Pt with good tolerance of today's treatment. Pt performing thoracic extension stretch and decompression stretches this date to alleviate current symptoms of L>R sided thoracic sharp/ache and lumbar region tightness/strain. Pt tolerating all therex without inc in symptoms.       Plan:     1-2x/week 12 weeks    R upper  back: posture stretching, strengthening, TENS or taping prn, scapular strengthening, can consider dry needling prn.   Lower back: Lower back with posture, body mechanics, core strengthening.  Modalities prn.     Insurance  Visit number: 5  Approved number of visits: 20  Auth required: Yes  Authorization date range: 20 VISITS, 2/28/25-5/30/25  Onset Date: 12/3/2024     Payor: [employer] / Plan: [employer] / Product Type: *No Product type* /     Precautions/Fall Risk:Fall Risk: Low based on professional judgment  Pacemaker: no    Seizures: No    Post Op Movement/Restrictions: No:  Weight Bearing Status: As tolerated  Other Medical precautions:    Subjective/Pain: Pt reports going to the gym the other day and using rowing machine and other equipment which increased L>R sided thoracic soreness initially, but decreasing following rest and stretches.  Current Pain Level (0-10): 2    HEP Compliance: Good    Objective/Outcome Measures:     Fwd head round shoulder posture     Slight dec lumbar lordosis in seated and standing     R scapular crepitus with ROM exercises.    Treatment  Therapeutic Procedure PT Therapeutic Procedures Time Entry  Manual Therapy Time Entry:  12  Therapeutic Exercise Time Entry: 44 minutes      Therapeutic Exercise 44 minutes    4/4 UBE + BLE lvl 5  Supine thoracic extension over foam roller x 6 mins  Supine Green theraband; B horizontal abduction, B shoulder external rotation   Standing cable column, 10#- D2 ext 3 x 10 ea UE  Standing cable column, 20#- Australian rows/press 2 x 10  Thoracic extension over swiss ball 4 x 1 min  Serratus punches into // x 50  Standing pull-up bar decompression 3 x 1 min      Not today  Supine 4# SPC; B Sh flex, B Serratus Punch, kayak rows, and B Sh ER x 30 ea  Cipriano pose<>prone on elbows 2 x 30s holds  Quadruped alt LE kicks x 20 ea LE  Quadruped alt UE reach x 20 ea UE  Bird Dogs x 20 ea  3 mins sciatic glides on swiss ball  2 mins LTR on swiss ball  Bridges on swiss ball 2 x 10  Quadruped serratus punches x 2 mins  Cobra 2 x 30s holds  Cybex rows 35# 3 x 10  Foam roller Serratus activation/thoracic ext at wall x 30  -HEP updated with resistance band strengthening-  2 mins open/close books on swiss ball   Wall crawl x 30s  Door way stretch x 30s  Cervical ext self mobe x 30s  Seated retro shoulder rolls 3 x 30  -hep updated and provided-       Manual Therapy 12 minutes     Manual BLE stretches; Hamstring, Piriformis, SKTC 2 x 30s    Manual scap retract assessment throughout       Neuromuscular Re-ed   minutes      Gait Training   minutes    Modalities   Vasopneumatic Device       minutes  Electrical Stimulation            minutes  Ultrasound                      minutes  Iontophoresis                     minutes  Cold Pack                        minutes  Mechanical Traction           minutes    OP EDUCATION:       Goals:     Patient's Goal for Treatment: Relieve pain, Reduce symptoms, and Return to prior level of function     To be met at time of discharge:  -- Decrease pain to _<1/10__.  -- Independent with HEP.  -- ROM: no pain with shoulder and L spine ROM with testing.   -- Strength: no pain with MMT B shoulder.   --  Functional outcome: demonstrate awareness of proper sitting posture in clinic.  --Able to tolerate scapular strengthening and RTC strengthening with no increased pain x25min in clinic.  --Able to tolerate prone hip ext, PIST test with no increased pain or pinching sensation along L spine.  --Improve quick dash to <10%, DORON to <8%.   --Demonstrate awareness of proper body mechanics and tolerating core strengthening with no increased pain after.  --Able to resume a cardio program (rowing at their preference) for fitness program consistently.

## 2025-05-22 ENCOUNTER — TREATMENT (OUTPATIENT)
Dept: PHYSICAL THERAPY | Facility: HOSPITAL | Age: 25
End: 2025-05-22
Payer: MEDICARE

## 2025-05-22 DIAGNOSIS — M54.9 UPPER BACK PAIN ON RIGHT SIDE: ICD-10-CM

## 2025-05-22 DIAGNOSIS — G89.29 CHRONIC RIGHT-SIDED LOW BACK PAIN WITHOUT SCIATICA: ICD-10-CM

## 2025-05-22 DIAGNOSIS — M54.50 CHRONIC RIGHT-SIDED LOW BACK PAIN WITHOUT SCIATICA: ICD-10-CM

## 2025-05-22 PROCEDURE — 97140 MANUAL THERAPY 1/> REGIONS: CPT | Mod: GP,CQ

## 2025-05-22 PROCEDURE — 97110 THERAPEUTIC EXERCISES: CPT | Mod: GP,CQ

## 2025-05-22 NOTE — PROGRESS NOTES
Physical Therapy Treatment    Patient Name:Pillo Guevara  MRN:[id number]  Today's Date:5/22/2025  Referred by: Kala Ortiz  Time Calculation  Start Time: 1633  Stop Time: 1731  Time Calculation (min): 58 min    Therapy Diagnosis  Problem List Items Addressed This Visit           ICD-10-CM    Upper back pain on right side M54.9    Chronic right-sided low back pain without sciatica M54.50, G89.29       Assessment:   Pt with good tolerance of today's treatment. Pt challenged with increased resistance, higher level core/abdominal stability exercises and RTC therex. Pt absent of c/o pain or discomfort this date throughout treatment. Pt with improved overall endurance during exercises. Pt requiring min/mod cues for proper technique and posture at times.     Plan:      1-2x/week 12 weeks    R upper  back: posture stretching, strengthening, TENS or taping prn, scapular strengthening, can consider dry needling prn.   Lower back: Lower back with posture, body mechanics, core strengthening.  Modalities prn.     Insurance  Visit number: 6  Approved number of visits: 20  Auth required: Yes  Authorization date range: 20 VISITS, 2/28/25-5/30/25  Onset Date: 12/3/2024     Payor: [employer] / Plan: [employer] / Product Type: *No Product type* /      Precautions/Fall Risk:Fall Risk: Low based on professional judgment  Pacemaker: no    Seizures: No    Post Op Movement/Restrictions: No:  Weight Bearing Status: As tolerated    Subjective/Pain: Pt reports after starting new job has had a slight increase in back pain. Pt reports only going to gym 1-2 times since last session, focusing on strengthening core/abdominals.   Current Pain Level (0-10): 2    HEP Compliance: Good    Objective/Outcome Measures:     Fwd head round shoulder posture     Slight dec lumbar lordosis in seated and standing     R scapular crepitus with ROM exercises.    Treatment  Therapeutic Procedure PT Therapeutic Procedures Time  Entry  Manual Therapy Time Entry: 13  Therapeutic Exercise Time Entry: 45 minutes      Therapeutic Exercise 45 minutes    8 mins; UBE + BLE lvl 8  Supine 10# SPC; Serratus Punches, B Sh flexion and B kayak style rows x 30 ea  Supine Blue theraband; B horizontal abduction, B shoulder external rotation  x 30 ea  Standing B Sh ER red band isometric + B Sh flex on foam roller x 30  Praying mantis on swiss ball 2 x 15  Standing cable column, 25#- caryl rows/press 2 x 10   Serratus punches into // x 50   Quadruped; alt UE weight shifts on bosu, dome down- 3 x 10    Not today  Supine 4# SPC; B Sh flex, B Serratus Punch, kayak rows, and B Sh ER x 30 ea  Cipriano pose<>prone on elbows 2 x 30s holds  Quadruped alt LE kicks x 20 ea LE  Quadruped alt UE reach x 20 ea UE  Bird Dogs x 20 ea  3 mins sciatic glides on swiss ball  2 mins LTR on swiss ball  Bridges on swiss ball 2 x 10  Quadruped serratus punches x 2 mins  Cobra 2 x 30s holds  Cybex rows 35# 3 x 10  Foam roller Serratus activation/thoracic ext at wall x 30  -HEP updated with resistance band strengthening-  2 mins open/close books on swiss ball   Wall crawl x 30s  Door way stretch x 30s  Cervical ext self mobe x 30s  Seated retro shoulder rolls 3 x 30  -hep updated and provided-      Manual Therapy 13 minutes    Manual BLE stretches; Hamstring, Piriformis, SKTC 2 x 30s    Manual scap retract assessment throughout    Neuromuscular Re-ed   minutes      Gait Training   minutes    Modalities   Vasopneumatic Device       minutes  Electrical Stimulation            minutes  Ultrasound                      minutes  Iontophoresis                     minutes  Cold Pack                        minutes  Mechanical Traction           minutes    OP EDUCATION:       Goals:    Patient's Goal for Treatment: Relieve pain, Reduce symptoms, and Return to prior level of function     To be met at time of discharge:  -- Decrease pain to _<1/10__.  -- Independent with HEP.  -- ROM: no pain with  shoulder and L spine ROM with testing.   -- Strength: no pain with MMT B shoulder.   -- Functional outcome: demonstrate awareness of proper sitting posture in clinic.  --Able to tolerate scapular strengthening and RTC strengthening with no increased pain x25min in clinic.  --Able to tolerate prone hip ext, PIST test with no increased pain or pinching sensation along L spine.  --Improve quick dash to <10%, DORON to <8%.   --Demonstrate awareness of proper body mechanics and tolerating core strengthening with no increased pain after.  --Able to resume a cardio program (rowing at their preference) for fitness program consistently.

## 2025-05-27 ENCOUNTER — TREATMENT (OUTPATIENT)
Dept: PHYSICAL THERAPY | Facility: HOSPITAL | Age: 25
End: 2025-05-27
Payer: MEDICARE

## 2025-05-27 DIAGNOSIS — G89.29 CHRONIC RIGHT-SIDED LOW BACK PAIN WITHOUT SCIATICA: ICD-10-CM

## 2025-05-27 DIAGNOSIS — M54.9 UPPER BACK PAIN ON RIGHT SIDE: ICD-10-CM

## 2025-05-27 DIAGNOSIS — M54.50 CHRONIC RIGHT-SIDED LOW BACK PAIN WITHOUT SCIATICA: ICD-10-CM

## 2025-05-27 PROCEDURE — 97110 THERAPEUTIC EXERCISES: CPT | Mod: GP,CQ

## 2025-05-27 NOTE — PROGRESS NOTES
Physical Therapy Treatment    Patient Name:Pillo Guevara  MRN:[id number]  Today's Date:5/27/2025  Referred by: Kala Ortiz  Time Calculation  Start Time: 1630  Stop Time: 1715  Time Calculation (min): 45 min    Therapy Diagnosis  Problem List Items Addressed This Visit           ICD-10-CM    Upper back pain on right side M54.9    Chronic right-sided low back pain without sciatica M54.50, G89.29       Assessment:   Pt with good tolerance of today's treatment. Pt initially with c/o inc in lower back discomfort/spasm. Pt performing lower back stretches/therex to reduce symptoms to improve tolerance of treatment.       Plan:    1-2x/week 12 weeks    R upper  back: posture stretching, strengthening, TENS or taping prn, scapular strengthening, can consider dry needling prn.   Lower back: Lower back with posture, body mechanics, core strengthening.  Modalities prn.     Insurance  Visit number: 7  Approved number of visits: 20  Auth required: Yes  Authorization date range: 20 VISITS, 2/28/25-5/30/25  Onset Date: 12/3/2024     Payor: [employer] / Plan: [employer] / Product Type: *No Product type* /      Precautions/Fall Risk:Fall Risk: Low based on professional judgment  Pacemaker: no    Seizures: No    Post Op Movement/Restrictions: No:  Weight Bearing Status: As tolerated    Subjective/Pain: Pt reports following a busy schedule of working the long weekend that their lower back has flared up and been sore ever since. Pt reports performing HEP to calm symptoms  Current Pain Level (0-10): 4    HEP Compliance: Good    Objective/Outcome Measures:     Fwd head round shoulder posture     Slight dec lumbar lordosis in seated and standing     R scapular crepitus with ROM exercises.       Treatment  Therapeutic Procedure PT Therapeutic Procedures Time Entry  Therapeutic Exercise Time Entry: 45 minutes      Therapeutic Exercise 45 minutes    8 mins; UBE + BLE lvl 8  Cipriano pose<>Cobra 2 x 30s holds ea   2  mins cat/cow  Bird Dog with red theraband x 30 ea  Supine 10# SPC; Serratus Punches, B Sh flexion and B kayak style rows x 30 ea  Supine Blue theraband; B horizontal abduction, B shoulder external rotation  x 30 ea  Standing B Sh ER red band isometric + B Sh flex on foam roller x 30  Praying mantis on swiss ball 2 x 15  Standing cable column, 25#- Citizen of [place] rows/press 2 x 10   Serratus punches into // x 50   Quadruped; alt UE weight shifts on bosu, dome down- 3 x 10     Not today  Supine 4# SPC; B Sh flex, B Serratus Punch, kayak rows, and B Sh ER x 30 ea  Cipriano pose<>prone on elbows 2 x 30s holds  Quadruped alt LE kicks x 20 ea LE  Quadruped alt UE reach x 20 ea UE  Bird Dogs x 20 ea  3 mins sciatic glides on swiss ball  2 mins LTR on swiss ball  Bridges on swiss ball 2 x 10  Quadruped serratus punches x 2 mins    Cybex rows 35# 3 x 10  Foam roller Serratus activation/thoracic ext at wall x 30  -HEP updated with resistance band strengthening-  2 mins open/close books on swiss ball   Wall crawl x 30s  Door way stretch x 30s  Cervical ext self mobe x 30s  Seated retro shoulder rolls 3 x 30  -hep updated and provided-    Manual Therapy   minutes     Manual BLE stretches; Hamstring, Piriformis, SKTC 2 x 30s    Manual scap retract assessment throughout       Neuromuscular Re-ed   minutes      Gait Training   minutes    Modalities   Vasopneumatic Device       minutes  Electrical Stimulation            minutes  Ultrasound                      minutes  Iontophoresis                     minutes  Cold Pack                        minutes  Mechanical Traction           minutes    OP EDUCATION:       Goals:  Patient's Goal for Treatment: Relieve pain, Reduce symptoms, and Return to prior level of function     To be met at time of discharge:  -- Decrease pain to _<1/10__.  -- Independent with HEP.  -- ROM: no pain with shoulder and L spine ROM with testing.   -- Strength: no pain with MMT B shoulder.   -- Functional outcome:  demonstrate awareness of proper sitting posture in clinic.  --Able to tolerate scapular strengthening and RTC strengthening with no increased pain x25min in clinic.  --Able to tolerate prone hip ext, PIST test with no increased pain or pinching sensation along L spine.  --Improve quick dash to <10%, DORON to <8%.   --Demonstrate awareness of proper body mechanics and tolerating core strengthening with no increased pain after.  --Able to resume a cardio program (rowing at their preference) for fitness program consistently.

## 2025-05-29 ENCOUNTER — TREATMENT (OUTPATIENT)
Dept: PHYSICAL THERAPY | Facility: HOSPITAL | Age: 25
End: 2025-05-29
Payer: MEDICARE

## 2025-05-29 DIAGNOSIS — G89.29 CHRONIC RIGHT-SIDED LOW BACK PAIN WITHOUT SCIATICA: ICD-10-CM

## 2025-05-29 DIAGNOSIS — M54.9 UPPER BACK PAIN ON RIGHT SIDE: ICD-10-CM

## 2025-05-29 DIAGNOSIS — M54.50 CHRONIC RIGHT-SIDED LOW BACK PAIN WITHOUT SCIATICA: ICD-10-CM

## 2025-05-29 PROCEDURE — 97110 THERAPEUTIC EXERCISES: CPT | Mod: GP,CQ

## 2025-05-29 NOTE — PROGRESS NOTES
Physical Therapy Treatment    Patient Name:Pillo Guevara  MRN:[id number]  Today's Date:5/29/2025  Referred by: Kala Ortiz  Time Calculation  Start Time: 1630  Stop Time: 1715  Time Calculation (min): 45 min    Therapy Diagnosis  Problem List Items Addressed This Visit           ICD-10-CM    Upper back pain on right side M54.9    Chronic right-sided low back pain without sciatica M54.50, G89.29       Assessment:   Pt receiving re-check/progress update this date to re-submit for authorization. Pt progressing with goals well, achieving 2/10 goals thus far. Pt absent of discomfort during all MMT of B Shoulders, Shoulder ROM and lumbar ROM WNL, however discomfort felt with rotations, end range ext and R side bending.       Plan:   1-2x/week 12 weeks    R upper  back: posture stretching, strengthening, TENS or taping prn, scapular strengthening, can consider dry needling prn.   Lower back: Lower back with posture, body mechanics, core strengthening.  Modalities prn.     Insurance  Visit number: 8/20  Approved number of visits: 20  Auth required: Yes  Authorization date range: 20 VISITS, 2/28/25-5/30/25  Onset Date: 12/3/2024     Payor: [employer] / Plan: [employer] / Product Type: *No Product type* /      Precautions/Fall Risk:Fall Risk: Low based on professional judgment  Pacemaker: no    Seizures: No    Post Op Movement/Restrictions: No:  Weight Bearing Status: As tolerated    Subjective/Pain: Pt reports continued mid back pain/stiffness and sensations that back needs to pop or crack. Pt continues to perform stretches as needed. Pt reports following a night of closing at work, pain in the morning reached a 5/10, but did have improvement following performance of HEP.  Current Pain Level (0-10): 4      HEP Compliance: Good    Objective/Outcome Measures:  Fwd head round shoulder posture     Slight dec lumbar lordosis in seated and standing     R scapular crepitus with ROM exercises.    Shoulder  MMT L/R   Flexion   5/5  Extension  4/4+  Abduction  4+/4+  IR   4+/4+  ER   4+/4+    QuickDASH Score: 29.5 / 100 = 29.5 %    Oswestry Score: 11 / 50 or 22 %    Treatment  Therapeutic Procedure PT Therapeutic Procedures Time Entry  Therapeutic Exercise Time Entry: 45 minutes      Therapeutic Exercise 45 minutes  8 mins; UBE + BLE lvl 8  -ROM Assessment-  -MMT B SHOULDER-  Prone 3# KB colón's; flex, hor abd and ext x 10 ea  Prone 3# KB Rows x 20  Quadruped serratus punches x 50  Cybex 60# rows 3 x 10  Fabio raises 30# 3 x 10    Not today  Cipriano pose<>Cobra 2 x 30s holds ea   2 mins cat/cow  Bird Dog with red theraband x 30 ea  Supine 10# SPC; Serratus Punches, B Sh flexion and B kayak style rows x 30 ea  Supine Blue theraband; B horizontal abduction, B shoulder external rotation  x 30 ea  Standing B Sh ER red band isometric + B Sh flex on foam roller x 30  Praying mantis on swiss ball 2 x 15  Standing cable column, 25#- fabio rows/press 2 x 10   Serratus punches into // x 50   Quadruped; alt UE weight shifts on bosu, dome down- 3 x 10     Supine 4# SPC; B Sh flex, B Serratus Punch, kayak rows, and B Sh ER x 30 ea  Cipriano pose<>prone on elbows 2 x 30s holds  Quadruped alt LE kicks x 20 ea LE  Quadruped alt UE reach x 20 ea UE  Bird Dogs x 20 ea  3 mins sciatic glides on swiss ball  2 mins LTR on swiss ball  Bridges on swiss ball 2 x 10  Quadruped serratus punches x 2 mins     Cybex rows 35# 3 x 10  Foam roller Serratus activation/thoracic ext at wall x 30  -HEP updated with resistance band strengthening-  2 mins open/close books on swiss ball   Wall crawl x 30s  Door way stretch x 30s  Cervical ext self mobe x 30s  Seated retro shoulder rolls 3 x 30  -hep updated and provided-    Manual Therapy   minutes      Neuromuscular Re-ed   minutes      Gait Training   minutes    Modalities   Vasopneumatic Device       minutes  Electrical Stimulation            minutes  Ultrasound                      minutes  Iontophoresis                      minutes  Cold Pack                        minutes  Mechanical Traction           minutes    OP EDUCATION:       Goals:  Patient's Goal for Treatment: Relieve pain, Reduce symptoms, and Return to prior level of function     To be met at time of discharge:  -- Decrease pain to _<1/10__. PROGRESSING  -- Independent with HEP. PROGRESSING  -- ROM: no pain with shoulder and L spine ROM with testing. PROGRESSING- SHOULDER NO PAIN L SPINE PAIN WITH END RANGE EXT AND ROTATION  -- Strength: no pain with MMT B shoulder. GOAL MET  -- Functional outcome: demonstrate awareness of proper sitting posture in clinic. PROGRESSING  --Able to tolerate scapular strengthening and RTC strengthening with no increased pain x25min in clinic. PROGRESSING  --Able to tolerate prone hip ext, PIST test with no increased pain or pinching sensation along L spine. GOAL MET  --Improve quick dash to <10%, DORON to <8%. PROGRESSING  --Demonstrate awareness of proper body mechanics and tolerating core strengthening with no increased pain after. PROGRESSING  --Able to resume a cardio program (rowing at their preference) for fitness program consistently. PROGRESSING (1-2x for ~10 minutes)    Date of Evaluation: 2025 - 2025    Onset Date: 12/3/2024    Referring Physician: Kala Ortiz     Surgery in the Last 3 months:  no    CPT Codes: Therapeutic Exercise: 10221 Therapeutic Activity: 29374 Neuromuscular Re-Education: 60006 Manual Therapy: 78537 Gait Trainin Self-Care/Home Management Trainin Mechanical Traction: 89087 Electric Stimulation (Attended): 43473 Electric Stimulation (Unattended): 43342 Vasopneumatic Device: 14460 PT Re-Evaluation: 03149     Diagnosis:   Problem List Items Addressed This Visit           ICD-10-CM    Upper back pain on right side M54.9    Chronic right-sided low back pain without sciatica M54.50, G89.29          Functional Outcome:  Other Measures  Disability of Arm Shoulder Hand  (DASH): 29.5  Oswestry Disablity Index (DORON): 11    OT / PT Evaluation complexity:  low    Which of the following best describes the primary reason of therapy: Improving, restoring, or adapting functional mobility or skills    Visits Requested: 20    Date Range: 90 days    Select all conditions that apply: None of these apply     Re-evaluation only:    Long term goals Met: 2/10    Zeeshan Guillen, PTA

## 2025-06-02 NOTE — ASSESSMENT & PLAN NOTE
Stable  No red flags or alarming signs  No focal neurological deficit on exam  Rx naproxen, flexeril  As above  Orders:    cyclobenzaprine (Flexeril) 5 mg tablet; Take 1 tablet (5 mg) by mouth as needed at bedtime for muscle spasms for up to 21 days.    naproxen (Naprosyn) 500 mg tablet; Take 1 tablet (500 mg) by mouth 2 times a day as needed for mild pain (1 - 3) (pain) for up to 14 days.

## 2025-06-02 NOTE — ASSESSMENT & PLAN NOTE
Stable  As above  Orders:    buPROPion SR (Wellbutrin SR) 150 mg 12 hr tablet; Take 1 tablet (150 mg) by mouth 2 times a day. Do not crush, chew, or split.    escitalopram (Lexapro) 10 mg tablet; Take 1 tablet (10 mg) by mouth once daily.

## 2025-06-02 NOTE — ASSESSMENT & PLAN NOTE
Stable  Denies SI/HI/Hallucinations  Continue wellbutrin, lexapro  Encouraged compliancy  Referral to Behavior Health for CBT

## 2025-06-02 NOTE — ASSESSMENT & PLAN NOTE
Stable  No focal neurological deficit  Likely MSK  Rx flexeril, naproxen  Gentle stretches, massages, exercise  Alternate between cool and warm compresses  Flexeril precaution discussed  Orders:    cyclobenzaprine (Flexeril) 5 mg tablet; Take 1 tablet (5 mg) by mouth as needed at bedtime for muscle spasms for up to 21 days.    naproxen (Naprosyn) 500 mg tablet; Take 1 tablet (500 mg) by mouth 2 times a day as needed for mild pain (1 - 3) (pain) for up to 14 days.

## 2025-06-02 NOTE — ASSESSMENT & PLAN NOTE
Stable  Denies SI/HI/Hallucinations  Referral to Behavorial health for CBT  Continue wellbutrin, Lexapro  Orders:    Referral to [facility]; Future    buPROPion SR (Wellbutrin SR) 150 mg 12 hr tablet; Take 1 tablet (150 mg) by mouth 2 times a day. Do not crush, chew, or split.    escitalopram (Lexapro) 10 mg tablet; Take 1 tablet (10 mg) by mouth once daily.

## 2025-06-16 ENCOUNTER — TREATMENT (OUTPATIENT)
Dept: PHYSICAL THERAPY | Facility: HOSPITAL | Age: 25
End: 2025-06-16
Payer: MEDICARE

## 2025-06-16 DIAGNOSIS — M54.9 UPPER BACK PAIN ON RIGHT SIDE: ICD-10-CM

## 2025-06-16 DIAGNOSIS — M54.50 CHRONIC RIGHT-SIDED LOW BACK PAIN WITHOUT SCIATICA: ICD-10-CM

## 2025-06-16 DIAGNOSIS — G89.29 CHRONIC RIGHT-SIDED LOW BACK PAIN WITHOUT SCIATICA: ICD-10-CM

## 2025-06-16 PROCEDURE — 97110 THERAPEUTIC EXERCISES: CPT | Mod: GP,CQ

## 2025-06-16 PROCEDURE — 97140 MANUAL THERAPY 1/> REGIONS: CPT | Mod: GP,CQ

## 2025-06-16 NOTE — PROGRESS NOTES
Physical Therapy Treatment    Patient Name:Pillo Guevara  MRN:[id number]  Today's Date:6/16/2025  Referred by: Kala Ortiz  Time Calculation  Start Time: 1529  Stop Time: 1613  Time Calculation (min): 44 min    Therapy Diagnosis  Problem List Items Addressed This Visit           ICD-10-CM    Upper back pain on right side M54.9    Chronic right-sided low back pain without sciatica M54.50, G89.29       Assessment:   Pt tolerated treatment session fairly well this date. Pt needing several rest/water breaks d/t fatigue, however overall tolerating increased resistance, reps and complexity of therex this date. Pt absent of discomfort during and post treatment. Pt educated on continued performance of HEP.       Plan:    1-2x/week 12 weeks    R upper  back: posture stretching, strengthening, TENS or taping prn, scapular strengthening, can consider dry needling prn.   Lower back: Lower back with posture, body mechanics, core strengthening.  Modalities prn.     Insurance  Visit number: 9/20  Approved number of visits: 30  Auth required: Yes  Authorization date range: 20 + 10 VISITS, 2/28/25-5/30/25 + 6/2/25-9/5/25  Onset Date: 12/3/2024      Precautions/Fall Risk:  Fall Risk: Low based on professional judgment  Pacemaker: no    Seizures: No    Post Op Movement/Restrictions: No:  Weight Bearing Status: As tolerated    Subjective/Pain: Pt reporting back to therapy after 1 week off. Pt reports symptoms still increase following working, but they are able to manage with stretches and HEP.   Current Pain Level (0-10): 3    HEP Compliance: Good    Objective/Outcome Measures:    Fwd head round shoulder posture     Slight dec lumbar lordosis in seated and standing    Treatment  Therapeutic Procedure PT Therapeutic Procedures Time Entry  Therapeutic Exercise Time Entry: 32  Manual Therapy Time Entry: 12 minutes      Therapeutic Exercise 32 minutes    8 mins; UBE + BLE lvl 8  Supine on half moon roller 5# DB flys 2 x 30  8# chest  press on swiss ball x 30  Swiss ball crawl outs in push-ups x 10  [place] raises 30# 3 x 10  Cybex 65# rows 3 x 10  R Sh IR stretch x 2  Seated retro shoulder rolls 2 x 30       Not today    Prone 3# KB colón's; flex, hor abd and ext x 10 ea  Prone 3# KB Rows x 20  Quadruped serratus punches x 50    Cipriano pose<>Cobra 2 x 30s holds ea   2 mins cat/cow  Bird Dog with red theraband x 30 ea  Supine 10# SPC; Serratus Punches, B Sh flexion and B kayak style rows x 30 ea  Supine Blue theraband; B horizontal abduction, B shoulder external rotation  x 30 ea  Standing B Sh ER red band isometric + B Sh flex on foam roller x 30  Praying mantis on swiss ball 2 x 15  Standing cable column, 25#- caryl rows/press 2 x 10   Serratus punches into // x 50   Quadruped; alt UE weight shifts on bosu, dome down- 3 x 10  Supine 4# SPC; B Sh flex, B Serratus Punch, kayak rows, and B Sh ER x 30 ea  Cipriano pose<>prone on elbows 2 x 30s holds  Quadruped alt LE kicks x 20 ea LE  Quadruped alt UE reach x 20 ea UE  Bird Dogs x 20 ea  3 mins sciatic glides on swiss ball  2 mins LTR on swiss ball  Bridges on swiss ball 2 x 10  Quadruped serratus punches x 2 mins     Cybex rows 35# 3 x 10  Foam roller Serratus activation/thoracic ext at wall x 30  -HEP updated with resistance band strengthening-  2 mins open/close books on swiss ball   Wall crawl x 30s  Door way stretch x 30s  Cervical ext self mobe x 30s  Seated retro shoulder rolls 3 x 30  -hep updated and provided-    Manual Therapy 12 minutes  MET R Levator scap stretch x 3  Manual scap retract   STM/TPR to R subscap, infraspinatus, supraspinatus, levator scap and upper trap    Neuromuscular Re-ed   minutes      Gait Training   minutes    Modalities   Vasopneumatic Device       minutes  Electrical Stimulation            minutes  Ultrasound                      minutes  Iontophoresis                     minutes  Cold Pack                        minutes  Mechanical Traction           minutes    OP  EDUCATION:       Goals:    Patient's Goal for Treatment: Relieve pain, Reduce symptoms, and Return to prior level of function     To be met at time of discharge:  -- Decrease pain to _<1/10__. PROGRESSING  -- Independent with HEP. PROGRESSING  -- ROM: no pain with shoulder and L spine ROM with testing. PROGRESSING- SHOULDER NO PAIN L SPINE PAIN WITH END RANGE EXT AND ROTATION  -- Strength: no pain with MMT B shoulder. GOAL MET  -- Functional outcome: demonstrate awareness of proper sitting posture in clinic. PROGRESSING  --Able to tolerate scapular strengthening and RTC strengthening with no increased pain x25min in clinic. PROGRESSING  --Able to tolerate prone hip ext, PIST test with no increased pain or pinching sensation along L spine. GOAL MET  --Improve quick dash to <10%, DORON to <8%. PROGRESSING  --Demonstrate awareness of proper body mechanics and tolerating core strengthening with no increased pain after. PROGRESSING  --Able to resume a cardio program (rowing at their preference) for fitness program consistently. PROGRESSING (1-2x for ~10 minutes)

## 2025-06-17 ENCOUNTER — APPOINTMENT (OUTPATIENT)
Dept: PRIMARY CARE | Facility: CLINIC | Age: 25
End: 2025-06-17
Payer: MEDICARE

## 2025-06-18 ENCOUNTER — APPOINTMENT (OUTPATIENT)
Dept: PRIMARY CARE | Facility: CLINIC | Age: 25
End: 2025-06-18
Payer: MEDICARE

## 2025-06-25 ENCOUNTER — APPOINTMENT (OUTPATIENT)
Dept: PRIMARY CARE | Facility: CLINIC | Age: 25
End: 2025-06-25
Payer: MEDICARE

## 2025-06-26 ENCOUNTER — TREATMENT (OUTPATIENT)
Dept: PHYSICAL THERAPY | Facility: HOSPITAL | Age: 25
End: 2025-06-26
Payer: MEDICARE

## 2025-06-26 DIAGNOSIS — G89.29 CHRONIC RIGHT-SIDED LOW BACK PAIN WITHOUT SCIATICA: ICD-10-CM

## 2025-06-26 DIAGNOSIS — M54.50 CHRONIC RIGHT-SIDED LOW BACK PAIN WITHOUT SCIATICA: ICD-10-CM

## 2025-06-26 DIAGNOSIS — M54.9 UPPER BACK PAIN ON RIGHT SIDE: Primary | ICD-10-CM

## 2025-06-26 PROCEDURE — 97110 THERAPEUTIC EXERCISES: CPT | Mod: GP | Performed by: PHYSICAL THERAPIST

## 2025-06-26 PROCEDURE — 97140 MANUAL THERAPY 1/> REGIONS: CPT | Mod: GP | Performed by: PHYSICAL THERAPIST

## 2025-06-26 NOTE — PROGRESS NOTES
Physical Therapy Re-evaluation    Patient Name:Pillo Guevara   MRN:[id number]   Today's Date:6/27/2025   Referred by: Kala Ortiz   Time Calculation  Start Time: 0315  Stop Time: 0400  Time Calculation (min): 45 min       PT Therapeutic Procedures Time Entry  Therapeutic Exercise Time Entry: 35  Manual Therapy Time Entry: 10       Insurance  Visit number: 10 (2 of 10)  Approved number of visits: 20V 2/28/25--5/30/25, 10V 6/2/25--9/5/25   Auth required: Yes  Authorization date range:   Onset Date: 12/3/2024    Payor: [employer] / Plan: [employer] / Product Type: *No Product type* /     Assessment:   25 yo patient seen in PT for 10 visits for c/o R upper back pain and chronic LBP, for R upper back pain, continues with crunching sensation and palpable trigger point, overall good strength but with pain, consistent with posture dysfunction, for LBP, continues with decreased core strength associated with prolonged bending and lifting at work, some improvement on awareness of body mechanics at this time, further PT will focus on STM, possible dry needling R upper back then scapular strengthening to help maintain proper posture for long term sx management, for LBP, focus on neutral spine core stab program with body mechanics training, modalities prn.  Pt is reviewed with further POC today and is in agreement of plan.       Plan:  1x/week for next 8 weeks (2x with PTA, rest with PT) to focus on posture stretching, STM with massage gun to R upper back,  scapular strengthening, consider dry needling,  focus on body mechanics training and neutral spine focused core stab program, modalities prn.        Precautions/Fall Risk:  Fall Risk: Low based on professional judgment  Pacemaker: no    Seizures: No    Post Op Movement/Restrictions: No:  Weight Bearing Status: As tolerated  Other Medical precautions:      OP PT Plan  PT Plan: Skilled PT  PT Frequency: Other (Comment) (1-2/week)  Duration: 12  week  Onset Date: 12/03/24  Number of Treatments Authorized: peter 20V 2/28/25--5/30/25, 10V 6/2/25--9/5/25  Rehab Potential: Good  Plan of Care Agreement: Patient    Therapy Diagnosis   Problem List Items Addressed This Visit           ICD-10-CM       Musculoskeletal and Injuries    Upper back pain on right side - Primary M54.9    Chronic right-sided low back pain without sciatica M54.50, G89.29       Current Problem  1. Upper back pain on right side  Follow Up In Physical Therapy      2. Chronic right-sided low back pain without sciatica  Follow Up In Physical Therapy              Subjective/Pain:  Current Pain Level (0-10): 3  Pt states PT has been helping overall, still having pain along R upper back (crunching), mid back and lower back throughout the day, still has job duties with prolonged standing and reaching, carrying activities.  Pain at rest 2-3/10, worst waking up from sleeping (taking muscle relaxers  at night).  Pt states he has had a flare up of LBP after one sessions of PT.     HEP Compliance: Fair    General:  General  Reason for Referral: M54.50,G89.29 (ICD-10-CM) - Chronic right-sided low back pain without sciatica  M25.511 (ICD-10-CM) - Acute pain of right shoulder  Referred By: Kala Ortiz  Past Medical History Relevant to Rehab: none significant  Preferred Learning Style: verbal, visual, written  Precautions:       Objective/Outcome Measures:  Re-eval:   OBSERVATION: able to correct with proper sitting posture, still tends to lose lumbar lordosis or compensation with excessive trunk ext but able to correct.       R scapular clock  with muscle crunching at L levator and ant to scapula area.      SENSATION: WNL     PALPATION: significant TTP along R levator, rhomboids, infraspinatus area. L levator,  OK with serratus ant.  OK with L spine paraspinals.       SHOULDER ROM: (R/L) *Pain WNL all directions no increased pain today.       SHOULDER STRENGTH: (R/L) *Pain 4+/5 no increased pain  today.       L spine ROM WNL, pinching sensation with end range ext, R SB and L rot at lower L spine and L.S junction.      Strength WNL B LE  LE bicycle with decreased core control with discomfort on lower back  Single leg bridging hold--able with effort for pelvic control x5     Special test:   Neg SLR  Prone hip ext with decreased multifidus activation  L spine joint play decreased with no increased pain  PIST +  Impingement test:       Painful arc   neg        Scapulohumeral rhythm WNL       Functional strength:  SLS WNL  Double leg squat with  no increased pain  Forward T with discomfort on L/S junction control.         FUNCTIONAL OUTCOME MEASURE:--Quick Dash 20%  Oswestry 24%       Outcome Measures:  Other Measures  Disability of Arm Shoulder Hand (DASH): 20  Oswestry Disablity Index (DORON): 24    Treatments:     PT Therapeutic Procedures Time Entry  Therapeutic Exercise Time Entry: 35  Manual Therapy Time Entry: 10 minutes  Time Calculation  Start Time: 0315  Stop Time: 0400  Time Calculation (min): 45 min       PT Therapeutic Procedures Time Entry  Therapeutic Exercise Time Entry: 35  Manual Therapy Time Entry: 10     Therapeutic exercises:   Re-eval  Reviewed with pt further POC, continue with proper posture, avoid excessive trunk ext for compensation to achieve lumbar neutral spine, continue with shoulder roll but less range and focus on proper posture.  Further PT will work on STM, consider dry needling if needed for scapular muscle trigger point, neutral spine based core strengthening with body mechanics training.    Manual Therapy 10 minutes  STM with massage gun to R UT and levator area in prone then in sitting (L levator added), with some improvement of muscle crunching noted.       Education/Resources provided today: Home Program   [employer]:    OP EDUCATION:  Outpatient Education  Individual(s) Educated: Patient  Education Provided: Home Exercise Program, POC  Patient/Caregiver Demonstrated  Understanding: yes  Plan of Care Discussed and Agreed Upon: yes  Patient Response to Education: Patient/Caregiver Verbalized Understanding of Information    Goals:  Patient's Goal for Treatment: Relieve pain, Reduce symptoms, and Return to prior level of function     To be met at time of discharge:--working towards but not met.    -- Decrease pain to _<1/10__.  -- Independent with HEP.  -- ROM: no pain with shoulder and L spine ROM with testing.   -- Strength: no pain with MMT B shoulder.   -- Functional outcome: demonstrate awareness of proper sitting posture in clinic.  Able to tolerate scapular strengthening and RTC strengthening with no increased pain x25min in clinic.  Able to tolerate prone hip ext, PIST test with no increased pain or pinching sensation along L spine.  Improve quick dash to <10%, DORON to <8%.   Demonstrate awareness of proper body mechanics and tolerating core strengthening with no increased pain after.  Able to resume a cardio program (rowing at his preference) for fitness program consistently.

## 2025-06-30 ENCOUNTER — APPOINTMENT (OUTPATIENT)
Dept: PRIMARY CARE | Facility: CLINIC | Age: 25
End: 2025-06-30
Payer: MEDICARE

## 2025-07-07 ENCOUNTER — APPOINTMENT (OUTPATIENT)
Dept: PRIMARY CARE | Facility: CLINIC | Age: 25
End: 2025-07-07
Payer: MEDICARE

## 2025-07-07 VITALS
HEART RATE: 79 BPM | OXYGEN SATURATION: 95 % | WEIGHT: 260.2 LBS | SYSTOLIC BLOOD PRESSURE: 108 MMHG | BODY MASS INDEX: 35.29 KG/M2 | DIASTOLIC BLOOD PRESSURE: 68 MMHG

## 2025-07-07 DIAGNOSIS — M54.50 CHRONIC RIGHT-SIDED LOW BACK PAIN WITHOUT SCIATICA: ICD-10-CM

## 2025-07-07 DIAGNOSIS — F33.41 MAJOR DEPRESSIVE DISORDER, RECURRENT EPISODE, IN PARTIAL REMISSION: ICD-10-CM

## 2025-07-07 DIAGNOSIS — F41.1 GAD (GENERALIZED ANXIETY DISORDER): ICD-10-CM

## 2025-07-07 DIAGNOSIS — Z78.9: ICD-10-CM

## 2025-07-07 DIAGNOSIS — G89.29 CHRONIC RIGHT-SIDED LOW BACK PAIN WITHOUT SCIATICA: ICD-10-CM

## 2025-07-07 DIAGNOSIS — F12.20 CANNABIS DEPENDENCE: ICD-10-CM

## 2025-07-07 DIAGNOSIS — M54.9 UPPER BACK PAIN ON RIGHT SIDE: Primary | ICD-10-CM

## 2025-07-07 DIAGNOSIS — F17.200 NICOTINE USE DISORDER: ICD-10-CM

## 2025-07-07 PROCEDURE — 99214 OFFICE O/P EST MOD 30 MIN: CPT | Performed by: STUDENT IN AN ORGANIZED HEALTH CARE EDUCATION/TRAINING PROGRAM

## 2025-07-07 RX ORDER — NAPROXEN 500 MG/1
500 TABLET ORAL 2 TIMES DAILY PRN
COMMUNITY
Start: 2025-06-06

## 2025-07-07 RX ORDER — CYCLOBENZAPRINE HCL 5 MG
5 TABLET ORAL 3 TIMES DAILY PRN
COMMUNITY
Start: 2025-06-06

## 2025-07-07 ASSESSMENT — ENCOUNTER SYMPTOMS
SINUS PAIN: 0
SORE THROAT: 0
PHOTOPHOBIA: 0
CHEST TIGHTNESS: 0
COUGH: 0
PAIN: 1
SINUS PRESSURE: 0
ARTHRALGIAS: 1
CHILLS: 0
AGITATION: 0
MYALGIAS: 1
FEVER: 0
DIZZINESS: 0
SHORTNESS OF BREATH: 0
ABDOMINAL PAIN: 0
BACK PAIN: 1
EYE REDNESS: 0
WHEEZING: 0

## 2025-07-07 ASSESSMENT — ANXIETY QUESTIONNAIRES
4. TROUBLE RELAXING: SEVERAL DAYS
1. FEELING NERVOUS, ANXIOUS, OR ON EDGE: SEVERAL DAYS
5. BEING SO RESTLESS THAT IT IS HARD TO SIT STILL: SEVERAL DAYS
7. FEELING AFRAID AS IF SOMETHING AWFUL MIGHT HAPPEN: SEVERAL DAYS
GAD7 TOTAL SCORE: 7
2. NOT BEING ABLE TO STOP OR CONTROL WORRYING: SEVERAL DAYS
3. WORRYING TOO MUCH ABOUT DIFFERENT THINGS: SEVERAL DAYS
6. BECOMING EASILY ANNOYED OR IRRITABLE: SEVERAL DAYS
IF YOU CHECKED OFF ANY PROBLEMS ON THIS QUESTIONNAIRE, HOW DIFFICULT HAVE THESE PROBLEMS MADE IT FOR YOU TO DO YOUR WORK, TAKE CARE OF THINGS AT HOME, OR GET ALONG WITH OTHER PEOPLE: SOMEWHAT DIFFICULT

## 2025-07-07 ASSESSMENT — PATIENT HEALTH QUESTIONNAIRE - PHQ9
SUM OF ALL RESPONSES TO PHQ9 QUESTIONS 1 AND 2: 2
1. LITTLE INTEREST OR PLEASURE IN DOING THINGS: SEVERAL DAYS
SUM OF ALL RESPONSES TO PHQ9 QUESTIONS 1 AND 2: 2
1. LITTLE INTEREST OR PLEASURE IN DOING THINGS: SEVERAL DAYS
2. FEELING DOWN, DEPRESSED OR HOPELESS: SEVERAL DAYS
2. FEELING DOWN, DEPRESSED OR HOPELESS: SEVERAL DAYS

## 2025-07-07 ASSESSMENT — COLUMBIA-SUICIDE SEVERITY RATING SCALE - C-SSRS
1. IN THE PAST MONTH, HAVE YOU WISHED YOU WERE DEAD OR WISHED YOU COULD GO TO SLEEP AND NOT WAKE UP?: NO
2. HAVE YOU ACTUALLY HAD ANY THOUGHTS OF KILLING YOURSELF?: NO
6. HAVE YOU EVER DONE ANYTHING, STARTED TO DO ANYTHING, OR PREPARED TO DO ANYTHING TO END YOUR LIFE?: NO

## 2025-07-07 NOTE — ASSESSMENT & PLAN NOTE
Stable  Denies SI/HI/Hallucinations   Follows up with psych  Arpan Escalante -Licensed Clinical Counselor

## 2025-07-07 NOTE — ASSESSMENT & PLAN NOTE
Stable  No focal neurological deficit  No red flag or alarming symptoms  Gentle stretches, massages  Alternate between cool and warm compresses   Nsaids prn  Will continue to monitor

## 2025-07-07 NOTE — PROGRESS NOTES
Subjective   Patient ID: Margaret Guevara is a 24 y.o. adult who presents for Pain (Follow up appt for shoulder and back pain. ).    SUBJECTIVE:   Margaret Guevara is a 24 y.o. male follow up today .  Hx of MDD/Anxiety, acute on chronic upper and lower back pain, non radiating, no numbness, tingling, paresthesia, no saddle anesthesia, no urinary or bowel changes, no trauma. Aggravated from strenuous activity, gym, rowing crew, improves and alleviates with rest. Currently unemployed has potential employment doing interviews, lives alone, sexually active with both female and male, hx of hemorrhoids no brbpr/melena. Denies SI/HI/hallucinations. Vapes and marijuana user cutting back on his own, social drinker. Denies fever, chills, sob, cp, palpitations, abd pain, n/v/d/c,rash  Current Outpatient Medications:  buPROPion SR (Wellbutrin SR) 150 mg 12 hr tablet, Take by mouth 2 times a day. Do not crush, chew, or split., Disp: , Rfl:   escitalopram (Lexapro) 10 mg tablet, Take 1 tablet (10 mg) by mouth once daily., Disp: , Rfl:   lidocaine (Lidoderm) 5 % patch, Place 1 patch over 12 hours on the skin once daily. Remove & discard patch within 12 hours or as directed by MD., Disp: 10 patch, Rfl: 0  methocarbamol (Robaxin) 500 mg tablet, Take 1 tablet (500 mg) by mouth 4 times a day for 10 days., Disp: 30 tablet, Rfl: 0    No current facility-administered medications for this visit.    Allergies: Patient has no known allergies.     ROS:  Feeling well. No dyspnea or chest pain on exertion. No abdominal pain, change in bowel habits, black or bloody stools. No urinary tract symptoms. No neurological complaints.          Pain  Pertinent negatives include no abdominal pain, chest pain, fever, rash, shortness of breath or wheezing.        Review of Systems   Constitutional:  Negative for chills and fever.   HENT:  Negative for congestion, postnasal drip, sinus pressure, sinus pain, sore throat and tinnitus.    Eyes:  Negative for  photophobia and redness.   Respiratory:  Negative for cough, chest tightness, shortness of breath and wheezing.    Cardiovascular:  Negative for chest pain.   Gastrointestinal:  Negative for abdominal pain.   Musculoskeletal:  Positive for arthralgias, back pain and myalgias.   Skin:  Negative for rash.   Neurological:  Negative for dizziness.   Psychiatric/Behavioral:  Negative for agitation.    All other systems reviewed and are negative.      Objective   /68   Pulse 79   Wt 118 kg (260 lb 3.2 oz)   SpO2 95%   BMI 35.29 kg/m²     Physical Exam  Vitals and nursing note reviewed.   Constitutional:       Appearance: Normal appearance.   HENT:      Head: Normocephalic and atraumatic.      Nose: Nose normal.   Eyes:      Extraocular Movements: Extraocular movements intact.      Conjunctiva/sclera: Conjunctivae normal.      Pupils: Pupils are equal, round, and reactive to light.      Comments: Conjunctiva normal   No discharge, drainage  No chemosis  No periorbital or orbital cellulitis    Cardiovascular:      Rate and Rhythm: Normal rate and regular rhythm.   Pulmonary:      Effort: Pulmonary effort is normal.      Breath sounds: Normal breath sounds.      Comments: Speaking in full sentences  No labored breathing  No use of accessory muscles  LUNGS CTAB   Abdominal:      Palpations: Abdomen is soft.   Musculoskeletal:         General: Tenderness present. Normal range of motion.      Cervical back: Normal range of motion.      Comments: Spine midline  No bony ttp  + paraspinal lumbar and thoracic   +hypertonic ttp /spasm  Gait stable   Skin:     General: Skin is warm and dry.      Capillary Refill: Capillary refill takes less than 2 seconds.   Neurological:      General: No focal deficit present.      Mental Status: She is alert and oriented to person, place, and time.   Psychiatric:         Mood and Affect: Mood normal.         Behavior: Behavior normal.         Thought Content: Thought content normal.          Judgment: Judgment normal.       Assessment/Plan   Assessment & Plan  Upper back pain on right side  Stable  No focal neurological deficit  No red flag or alarming symptoms  Gentle stretches, massages  Alternate between cool and warm compresses   Nsaids prn  Will continue to monitor        Chronic right-sided low back pain without sciatica  Stable  Improving with gentle stretches massages, exercise, nasaids        CHAVA (generalized anxiety disorder)  Stable  Denies SI/HI/Hallucinations          Major depressive disorder, recurrent episode, in partial remission  Stable  Denies SI/HI/Hallucinations   Follows up with odalis Escalante -Licensed Clinical Counselor          Transgender female  Stable  Will schedule appointment with  Transgender care        Nicotine use disorder  Not ready to cut back at this time  Counseled on health risk associated with smoking        Cannabis dependence  Counseled on health risk associated with marijuana use     BMI 35.0-35.9,adult  Discussed and provided recommendations for weight loss (nutrition choices, physical activity/exercise, intermittent fasting and/or available medical therapies). Time spent ~ 15minutes.

## 2025-07-30 ENCOUNTER — TREATMENT (OUTPATIENT)
Dept: PHYSICAL THERAPY | Facility: HOSPITAL | Age: 25
End: 2025-07-30
Payer: MEDICARE

## 2025-07-30 DIAGNOSIS — M54.9 UPPER BACK PAIN ON RIGHT SIDE: Primary | ICD-10-CM

## 2025-07-30 DIAGNOSIS — M54.50 CHRONIC RIGHT-SIDED LOW BACK PAIN WITHOUT SCIATICA: ICD-10-CM

## 2025-07-30 DIAGNOSIS — G89.29 CHRONIC RIGHT-SIDED LOW BACK PAIN WITHOUT SCIATICA: ICD-10-CM

## 2025-07-30 PROCEDURE — 97110 THERAPEUTIC EXERCISES: CPT | Mod: GP | Performed by: PHYSICAL THERAPIST

## 2025-07-30 PROCEDURE — 97140 MANUAL THERAPY 1/> REGIONS: CPT | Mod: GP | Performed by: PHYSICAL THERAPIST

## 2025-07-30 NOTE — PROGRESS NOTES
Physical Therapy treatment    Patient Name:Pillo Guevara   MRN:[id number]   Today's Date:7/30/2025   Referred by: Kala Ortiz   Time Calculation  Start Time: 0515  Stop Time: 0600  Time Calculation (min): 45 min       PT Therapeutic Procedures Time Entry  Therapeutic Exercise Time Entry: 15  Manual Therapy Time Entry: 30       Insurance  Visit number: 11 (3 of 10)  Approved number of visits: 20V 2/28/25--5/30/25, 10V 6/2/25--9/5/25   Auth required: Yes  Authorization date range:   Onset Date: 12/3/2024    Payor: [employer] / Plan: [employer] / Product Type: *No Product type* /     Assessment:   6/26/25: 25 yo patient seen in PT for 10 visits for c/o R upper back pain and chronic LBP, for R upper back pain, continues with crunching sensation and palpable trigger point, overall good strength but with pain, consistent with posture dysfunction, for LBP, continues with decreased core strength associated with prolonged bending and lifting at work, some improvement on awareness of body mechanics at this time, further PT will focus on STM, possible dry needling R upper back then scapular strengthening to help maintain proper posture for long term sx management, for LBP, focus on neutral spine core stab program with body mechanics training, modalities prn.  Pt is reviewed with further POC today and is in agreement of plan.     7/30/25: less trigger point and crunching R upper back today, educated on dry needling, pt would like to defer and consider later on, today focused on further STM and scapular strengthening with proper balance and technique, tolerated well.        Plan:  1x/week for next 8 weeks to focus on posture stretching, STM with massage gun to R upper back,  scapular strengthening with proper technique, consider dry needling,  focus on body mechanics training and neutral spine focused core stab program, modalities prn.        Precautions/Fall Risk:  Fall Risk: Low based on  "professional judgment  Pacemaker: no    Seizures: No    Post Op Movement/Restrictions: No:  Weight Bearing Status: As tolerated  Other Medical precautions:      OP PT Plan  PT Plan: Skilled PT  PT Frequency: Other (Comment) (1-2/week)  Duration: 12 week  Onset Date: 12/03/24  Number of Treatments Authorized: peter 20V 2/28/25--5/30/25, 10V 6/2/25--9/5/25  Rehab Potential: Good  Plan of Care Agreement: Patient    Therapy Diagnosis   Problem List Items Addressed This Visit           ICD-10-CM       Musculoskeletal and Injuries    Upper back pain on right side - Primary M54.9    Chronic right-sided low back pain without sciatica M54.50, G89.29       Current Problem  1. Upper back pain on right side  Follow Up In Physical Therapy      2. Chronic right-sided low back pain without sciatica  Follow Up In Physical Therapy              Subjective/Pain:  Current Pain Level (0-10): 3-6/10  Pt states he has been having trouble with getting PT appt scheduled, overall pain the same,  sometimes feels midback pain as \"disk slipping sensation\", stress with life situation,  doing proper posture practice.    Today pain mostly on R upper back and mid back, some pain along R side glut region.      HEP Compliance: Fair    General:  General  Reason for Referral: M54.50,G89.29 (ICD-10-CM) - Chronic right-sided low back pain without sciatica  M25.511 (ICD-10-CM) - Acute pain of right shoulder  Referred By: Kala Ortiz  Past Medical History Relevant to Rehab: none significant  Preferred Learning Style: verbal, visual, written  Precautions:       Objective/Outcome Measures:     PALPATION: less TTP along R levator, rhomboids, infraspinatus area, less but reproducible crunching along R scapula with R shoulder [place].           Outcome Measures:       Treatments:     PT Therapeutic Procedures Time Entry  Therapeutic Exercise Time Entry: 15  Manual Therapy Time Entry: 30 minutes  Time Calculation  Start Time: 0515  Stop Time: " 0600  Time Calculation (min): 45 min       PT Therapeutic Procedures Time Entry  Therapeutic Exercise Time Entry: 15  Manual Therapy Time Entry: 30     Therapeutic exercises:   Rowing 4 plates x15  Chest press 4 plates x15  Shoulder press 4 plates x15  Lats pulldown 5 plates x15   Horizontal abd and add 10 # cable column x15 each R/L     Reviewed with pt further POC, continue with proper posture, avoid excessive trunk ext for compensation to achieve lumbar neutral spine, continue with shoulder roll but less range and focus on proper posture.      Manual Therapy 30 minutes  STM with massage gun to R UT and levator area in prone then in sitting (L levator added),   Discussed with dry needling treatment, information provided, pt would like to consider it before proceeding.      Education/Resources provided today: Home Program   [employer]:    OP EDUCATION:  Outpatient Education  Individual(s) Educated: Patient  Education Provided: Home Exercise Program, POC  Patient/Caregiver Demonstrated Understanding: yes  Plan of Care Discussed and Agreed Upon: yes  Patient Response to Education: Patient/Caregiver Verbalized Understanding of Information    Goals:  Patient's Goal for Treatment: Relieve pain, Reduce symptoms, and Return to prior level of function     To be met at time of discharge:--working towards but not met.    -- Decrease pain to _<1/10__.  -- Independent with HEP.  -- ROM: no pain with shoulder and L spine ROM with testing.   -- Strength: no pain with MMT B shoulder.   -- Functional outcome: demonstrate awareness of proper sitting posture in clinic.  Able to tolerate scapular strengthening and RTC strengthening with no increased pain x25min in clinic.  Able to tolerate prone hip ext, PIST test with no increased pain or pinching sensation along L spine.  Improve quick dash to <10%, DORON to <8%.   Demonstrate awareness of proper body mechanics and tolerating core strengthening with no increased pain after.  Able to  resume a cardio program (rowing at his preference) for fitness program consistently.

## 2025-08-05 ENCOUNTER — TREATMENT (OUTPATIENT)
Dept: PHYSICAL THERAPY | Facility: HOSPITAL | Age: 25
End: 2025-08-05
Payer: MEDICARE

## 2025-08-05 DIAGNOSIS — M54.50 CHRONIC RIGHT-SIDED LOW BACK PAIN WITHOUT SCIATICA: ICD-10-CM

## 2025-08-05 DIAGNOSIS — M54.9 UPPER BACK PAIN ON RIGHT SIDE: ICD-10-CM

## 2025-08-05 DIAGNOSIS — G89.29 CHRONIC RIGHT-SIDED LOW BACK PAIN WITHOUT SCIATICA: ICD-10-CM

## 2025-08-05 PROCEDURE — 97110 THERAPEUTIC EXERCISES: CPT | Mod: GP,CQ

## 2025-08-05 PROCEDURE — 97140 MANUAL THERAPY 1/> REGIONS: CPT | Mod: GP,CQ

## 2025-08-05 NOTE — PROGRESS NOTES
Physical Therapy Treatment    Patient Name:Pillo Guevara  MRN:[id number]  Today's Date:8/5/2025  Referred by: Kala Ortiz  Time Calculation  Start Time: 1720  Stop Time: 1818  Time Calculation (min): 58 min    Therapy Diagnosis  Problem List Items Addressed This Visit           ICD-10-CM    Upper back pain on right side M54.9    Chronic right-sided low back pain without sciatica M54.50, G89.29       Assessment:   Pt tolerated treatment session well this date. Pt focusing on scapular stability therex to improve posture, strength, stability of B shoulders to reduce shoulder and spine pain and discomfort. Pt then receiving manual techniques with soft tissue work, trigger point release and instrument assisted soft tissue mobilizations. Pt tolerating well with minimal discomfort noted. Pt educated on continued performance of HEP and additional stretches/exercises to reduce symptoms.       Plan:   1x/week for next 8 weeks to focus on posture stretching, STM with massage gun to R upper back,  scapular strengthening with proper technique, consider dry needling,  focus on body mechanics training and neutral spine focused core stab program, modalities prn.     Potential for cupping with Resistance/AROM RUE prone [facility]    Insurance  Visit number: 12(4 of 10)  Approved number of visits: 20V 2/28/25--5/30/25, 10V 6/2/25--9/5/25   Auth required: Yes  Authorization date range:   Onset Date: 12/3/2024     Payor: [employer] / Plan: [employer] / Product Type: *No Product type* /     Precautions/Fall Risk:Fall Risk: Low based on professional judgment  Pacemaker: no    Seizures: No    Post Op Movement/Restrictions: No:  Weight Bearing Status: As tolerated      Subjective/Pain: Pt reports continued R scapula/shoulder discomfort, tightness and audible crepitus. Pt notes waking up in the morning the pain is worse. Pt also states mid back/lumbar region feels like its slipping out of place or discs are moving.    Current Pain Level (0-10): 3-6/10    HEP Compliance: Good    Objective/Outcome Measures:    Cues for technique and postural corrections    TTP R>L rhomboid and erector spinae    TTP R Levator scap    Fwd head round shoulder posture     Treatment  Therapeutic Procedure PT Therapeutic Procedures Time Entry  Therapeutic Exercise Time Entry: 30  Manual Therapy Time Entry: 28 minutes      Therapeutic Exercise 30 minutes    4/4 UBE + BLE lvl 7  Prone 3# B Sh ext 3 x 10  Prone AROM B Sh Ext 5 x 10s holds (during manual)  Standing Cable Column; 35# Single arm Upward SA Punch x 20 ea UE  Lats pulldown 5 plates x 25   Rowing 5.5 plates x 25  -HEP education on SA punches/Landmines-  -modifications to perform with KB at home-    Not today  Chest press 4 plates x15  Shoulder press 4 plates x15  Horizontal abd and add 10 # cable column x15 each R/L   Reviewed with pt further POC, continue with proper posture, avoid excessive trunk ext for compensation to achieve lumbar neutral spine, continue with shoulder roll but less range and focus on proper posture.         Manual Therapy 28 minutes    STM, TPR & IASTM to R Upper trap, R levator Scap, B supraspinatus, B rhomboid, B infraspinatus, B lat, B thoracic and lumbar erector spinae, B subscap,     Not today  STM with massage gun to R UT and levator area in prone then in sitting (L levator added),   Discussed with dry needling treatment, information provided, pt would like to consider it before proceeding.      Neuromuscular Re-ed   minutes      Gait Training   minutes    Modalities   Vasopneumatic Device       minutes  Electrical Stimulation            minutes  Ultrasound                      minutes  Iontophoresis                     minutes  Cold Pack                        minutes  Mechanical Traction           minutes    OP EDUCATION:   Land mines/SA punches  Spine circles, cat/cow, door way stretch    Goals:    Patient's Goal for Treatment: Relieve pain, Reduce symptoms, and  Return to prior level of function     To be met at time of discharge:--working towards but not met.    -- Decrease pain to _<1/10__.  -- Independent with HEP.  -- ROM: no pain with shoulder and L spine ROM with testing.   -- Strength: no pain with MMT B shoulder.   -- Functional outcome: demonstrate awareness of proper sitting posture in clinic.  Able to tolerate scapular strengthening and RTC strengthening with no increased pain x25min in clinic.  Able to tolerate prone hip ext, PIST test with no increased pain or pinching sensation along L spine.  Improve quick dash to <10%, DORON to <8%.   Demonstrate awareness of proper body mechanics and tolerating core strengthening with no increased pain after.  Able to resume a cardio program (rowing at his preference) for fitness program consistently.

## 2025-08-11 ENCOUNTER — TREATMENT (OUTPATIENT)
Dept: PHYSICAL THERAPY | Facility: HOSPITAL | Age: 25
End: 2025-08-11
Payer: MEDICARE

## 2025-08-11 DIAGNOSIS — M54.50 CHRONIC RIGHT-SIDED LOW BACK PAIN WITHOUT SCIATICA: ICD-10-CM

## 2025-08-11 DIAGNOSIS — M54.9 UPPER BACK PAIN ON RIGHT SIDE: Primary | ICD-10-CM

## 2025-08-11 DIAGNOSIS — G89.29 CHRONIC RIGHT-SIDED LOW BACK PAIN WITHOUT SCIATICA: ICD-10-CM

## 2025-08-11 PROCEDURE — 20560 NDL INSJ W/O NJX 1 OR 2 MUSC: CPT | Mod: GP | Performed by: PHYSICAL THERAPIST

## 2025-08-11 PROCEDURE — 97140 MANUAL THERAPY 1/> REGIONS: CPT | Mod: GP | Performed by: PHYSICAL THERAPIST

## 2025-08-11 PROCEDURE — 97535 SELF CARE MNGMENT TRAINING: CPT | Mod: GP | Performed by: PHYSICAL THERAPIST

## 2025-08-19 ENCOUNTER — TREATMENT (OUTPATIENT)
Dept: PHYSICAL THERAPY | Facility: HOSPITAL | Age: 25
End: 2025-08-19
Payer: MEDICARE

## 2025-08-19 DIAGNOSIS — M54.9 UPPER BACK PAIN ON RIGHT SIDE: ICD-10-CM

## 2025-08-19 DIAGNOSIS — M54.50 CHRONIC RIGHT-SIDED LOW BACK PAIN WITHOUT SCIATICA: ICD-10-CM

## 2025-08-19 DIAGNOSIS — G89.29 CHRONIC RIGHT-SIDED LOW BACK PAIN WITHOUT SCIATICA: ICD-10-CM

## 2025-08-19 PROCEDURE — 20560 NDL INSJ W/O NJX 1 OR 2 MUSC: CPT | Mod: GP | Performed by: PHYSICAL THERAPIST

## 2025-08-19 PROCEDURE — 97110 THERAPEUTIC EXERCISES: CPT | Mod: GP | Performed by: PHYSICAL THERAPIST

## 2025-08-20 ENCOUNTER — APPOINTMENT (OUTPATIENT)
Dept: PHYSICAL THERAPY | Facility: HOSPITAL | Age: 25
End: 2025-08-20
Payer: MEDICARE

## 2025-08-21 ENCOUNTER — APPOINTMENT (OUTPATIENT)
Dept: PRIMARY CARE | Facility: CLINIC | Age: 25
End: 2025-08-21
Payer: MEDICARE

## 2025-08-21 VITALS
TEMPERATURE: 97.3 F | HEART RATE: 89 BPM | HEIGHT: 72 IN | DIASTOLIC BLOOD PRESSURE: 78 MMHG | WEIGHT: 263.6 LBS | OXYGEN SATURATION: 97 % | BODY MASS INDEX: 35.7 KG/M2 | SYSTOLIC BLOOD PRESSURE: 128 MMHG

## 2025-08-21 DIAGNOSIS — M54.50 CHRONIC RIGHT-SIDED LOW BACK PAIN WITHOUT SCIATICA: ICD-10-CM

## 2025-08-21 DIAGNOSIS — Z76.89 ENCOUNTER TO ESTABLISH CARE: Primary | ICD-10-CM

## 2025-08-21 DIAGNOSIS — F41.1 GAD (GENERALIZED ANXIETY DISORDER): ICD-10-CM

## 2025-08-21 DIAGNOSIS — F33.41 MAJOR DEPRESSIVE DISORDER, RECURRENT EPISODE, IN PARTIAL REMISSION: ICD-10-CM

## 2025-08-21 DIAGNOSIS — Z78.9: ICD-10-CM

## 2025-08-21 DIAGNOSIS — M25.511 ACUTE PAIN OF RIGHT SHOULDER: ICD-10-CM

## 2025-08-21 DIAGNOSIS — M79.644 THUMB PAIN, RIGHT: ICD-10-CM

## 2025-08-21 DIAGNOSIS — G89.29 CHRONIC RIGHT-SIDED LOW BACK PAIN WITHOUT SCIATICA: ICD-10-CM

## 2025-08-21 DIAGNOSIS — S69.91XA INJURY OF RIGHT HAND, INITIAL ENCOUNTER: ICD-10-CM

## 2025-08-21 PROCEDURE — 99215 OFFICE O/P EST HI 40 MIN: CPT | Performed by: NURSE PRACTITIONER

## 2025-08-21 PROCEDURE — 3008F BODY MASS INDEX DOCD: CPT | Performed by: NURSE PRACTITIONER

## 2025-08-21 PROCEDURE — 1036F TOBACCO NON-USER: CPT | Performed by: NURSE PRACTITIONER

## 2025-08-21 RX ORDER — METHOCARBAMOL 500 MG/1
500 TABLET, FILM COATED ORAL 3 TIMES DAILY PRN
Qty: 30 TABLET | Refills: 0 | Status: SHIPPED | OUTPATIENT
Start: 2025-08-21 | End: 2025-08-31

## 2025-08-21 RX ORDER — ESTRADIOL VALERATE 10 MG/ML
10 INJECTION INTRAMUSCULAR WEEKLY
COMMUNITY

## 2025-08-21 RX ORDER — NEEDLES, DISPOSABLE 25GX5/8"
NEEDLE, DISPOSABLE MISCELLANEOUS
COMMUNITY
Start: 2025-08-13

## 2025-08-21 RX ORDER — BUPROPION HYDROCHLORIDE 150 MG/1
150 TABLET, EXTENDED RELEASE ORAL 2 TIMES DAILY
Qty: 180 TABLET | Refills: 0 | Status: SHIPPED | OUTPATIENT
Start: 2025-08-21

## 2025-08-21 RX ORDER — ESCITALOPRAM OXALATE 10 MG/1
10 TABLET ORAL DAILY
Qty: 90 TABLET | Refills: 0 | Status: SHIPPED | OUTPATIENT
Start: 2025-08-21

## 2025-08-21 RX ORDER — NEEDLES, SAFETY 22GX1 1/2"
NEEDLE, DISPOSABLE MISCELLANEOUS
COMMUNITY
Start: 2025-08-13

## 2025-08-21 ASSESSMENT — ENCOUNTER SYMPTOMS
STRIDOR: 0
ADENOPATHY: 0
DIAPHORESIS: 0
CHILLS: 0
HEADACHES: 0
WOUND: 0
NUMBNESS: 0
CONFUSION: 0
CHEST TIGHTNESS: 0
VOMITING: 0
UNEXPECTED WEIGHT CHANGE: 0
DYSPHORIC MOOD: 1
WHEEZING: 0
AGITATION: 0
LIGHT-HEADEDNESS: 0
SLEEP DISTURBANCE: 0
FEVER: 0
SEIZURES: 0
DEPRESSION: 0
POLYDIPSIA: 0
DIZZINESS: 0
PALPITATIONS: 0
NAUSEA: 0
BRUISES/BLEEDS EASILY: 0
SHORTNESS OF BREATH: 0
FATIGUE: 0
HYPERACTIVE: 0
POLYPHAGIA: 0
HALLUCINATIONS: 0
APPETITE CHANGE: 0
LOSS OF SENSATION IN FEET: 0
ABDOMINAL PAIN: 0
FLANK PAIN: 0
CHOKING: 0
OCCASIONAL FEELINGS OF UNSTEADINESS: 0
DIARRHEA: 0
WEAKNESS: 0
DYSURIA: 0
DECREASED CONCENTRATION: 0
APNEA: 0
HEMATURIA: 0
TREMORS: 0
BACK PAIN: 1
NERVOUS/ANXIOUS: 1
COUGH: 0
ACTIVITY CHANGE: 0

## 2025-08-21 ASSESSMENT — PAIN SCALES - GENERAL: PAINLEVEL_OUTOF10: 0-NO PAIN

## 2025-08-26 ENCOUNTER — TREATMENT (OUTPATIENT)
Dept: PHYSICAL THERAPY | Facility: HOSPITAL | Age: 25
End: 2025-08-26
Payer: MEDICARE

## 2025-08-26 DIAGNOSIS — M54.9 UPPER BACK PAIN ON RIGHT SIDE: ICD-10-CM

## 2025-08-26 DIAGNOSIS — G89.29 CHRONIC RIGHT-SIDED LOW BACK PAIN WITHOUT SCIATICA: Primary | ICD-10-CM

## 2025-08-26 DIAGNOSIS — M54.50 CHRONIC RIGHT-SIDED LOW BACK PAIN WITHOUT SCIATICA: Primary | ICD-10-CM

## 2025-08-26 PROCEDURE — 97110 THERAPEUTIC EXERCISES: CPT | Mod: GP | Performed by: PHYSICAL THERAPIST

## 2025-08-27 ENCOUNTER — APPOINTMENT (OUTPATIENT)
Dept: PHYSICAL THERAPY | Facility: HOSPITAL | Age: 25
End: 2025-08-27
Payer: MEDICARE

## 2025-08-28 ENCOUNTER — APPOINTMENT (OUTPATIENT)
Dept: PHYSICAL THERAPY | Facility: HOSPITAL | Age: 25
End: 2025-08-28
Payer: MEDICARE

## 2025-09-02 ENCOUNTER — TREATMENT (OUTPATIENT)
Dept: PHYSICAL THERAPY | Facility: HOSPITAL | Age: 25
End: 2025-09-02
Payer: MEDICARE

## 2025-09-02 DIAGNOSIS — G89.29 CHRONIC RIGHT-SIDED LOW BACK PAIN WITHOUT SCIATICA: Primary | ICD-10-CM

## 2025-09-02 DIAGNOSIS — M54.50 CHRONIC RIGHT-SIDED LOW BACK PAIN WITHOUT SCIATICA: Primary | ICD-10-CM

## 2025-09-02 DIAGNOSIS — M54.9 UPPER BACK PAIN ON RIGHT SIDE: ICD-10-CM

## 2025-09-02 PROCEDURE — 97110 THERAPEUTIC EXERCISES: CPT | Mod: GP | Performed by: PHYSICAL THERAPIST

## 2025-09-05 ENCOUNTER — TREATMENT (OUTPATIENT)
Dept: PHYSICAL THERAPY | Facility: HOSPITAL | Age: 25
End: 2025-09-05
Payer: MEDICARE

## 2025-09-05 DIAGNOSIS — G89.29 CHRONIC RIGHT-SIDED LOW BACK PAIN WITHOUT SCIATICA: ICD-10-CM

## 2025-09-05 DIAGNOSIS — M54.50 CHRONIC RIGHT-SIDED LOW BACK PAIN WITHOUT SCIATICA: ICD-10-CM

## 2025-09-05 DIAGNOSIS — M54.9 UPPER BACK PAIN ON RIGHT SIDE: ICD-10-CM

## 2025-09-05 PROCEDURE — 97110 THERAPEUTIC EXERCISES: CPT | Mod: GP | Performed by: PHYSICAL THERAPIST
